# Patient Record
Sex: FEMALE | Race: BLACK OR AFRICAN AMERICAN | NOT HISPANIC OR LATINO | Employment: UNEMPLOYED | URBAN - METROPOLITAN AREA
[De-identification: names, ages, dates, MRNs, and addresses within clinical notes are randomized per-mention and may not be internally consistent; named-entity substitution may affect disease eponyms.]

---

## 2020-09-17 ENCOUNTER — HOSPITAL ENCOUNTER (EMERGENCY)
Facility: CLINIC | Age: 40
Discharge: HOME | End: 2020-09-17
Attending: PHYSICIAN ASSISTANT | Admitting: PHYSICIAN ASSISTANT
Payer: COMMERCIAL

## 2020-09-17 VITALS
OXYGEN SATURATION: 99 % | RESPIRATION RATE: 16 BRPM | TEMPERATURE: 98.6 F | DIASTOLIC BLOOD PRESSURE: 108 MMHG | HEART RATE: 86 BPM | SYSTOLIC BLOOD PRESSURE: 188 MMHG | WEIGHT: 264.55 LBS

## 2020-09-17 DIAGNOSIS — L02.215 ABSCESS, PERINEUM: ICD-10-CM

## 2020-09-17 LAB
ALBUMIN UR-MCNC: 20 MG/DL
ANION GAP SERPL CALCULATED.3IONS-SCNC: 7 MMOL/L (ref 3–14)
APPEARANCE UR: CLEAR
BACTERIA #/AREA URNS HPF: ABNORMAL /HPF
BASOPHILS # BLD AUTO: 0 10E9/L (ref 0–0.2)
BASOPHILS NFR BLD AUTO: 0.3 %
BILIRUB UR QL STRIP: NEGATIVE
BUN SERPL-MCNC: 13 MG/DL (ref 7–30)
CALCIUM SERPL-MCNC: 9.3 MG/DL (ref 8.5–10.1)
CHLORIDE SERPL-SCNC: 105 MMOL/L (ref 94–109)
CO2 SERPL-SCNC: 25 MMOL/L (ref 20–32)
COLOR UR AUTO: ABNORMAL
CREAT SERPL-MCNC: 0.75 MG/DL (ref 0.52–1.04)
DIFFERENTIAL METHOD BLD: ABNORMAL
EOSINOPHIL # BLD AUTO: 0.1 10E9/L (ref 0–0.7)
EOSINOPHIL NFR BLD AUTO: 1 %
ERYTHROCYTE [DISTWIDTH] IN BLOOD BY AUTOMATED COUNT: 13.6 % (ref 10–15)
GFR SERPL CREATININE-BSD FRML MDRD: >90 ML/MIN/{1.73_M2}
GLUCOSE SERPL-MCNC: 132 MG/DL (ref 70–99)
GLUCOSE UR STRIP-MCNC: NEGATIVE MG/DL
HCT VFR BLD AUTO: 40 % (ref 35–47)
HGB BLD-MCNC: 12.4 G/DL (ref 11.7–15.7)
HGB UR QL STRIP: NEGATIVE
IMM GRANULOCYTES # BLD: 0 10E9/L (ref 0–0.4)
IMM GRANULOCYTES NFR BLD: 0.3 %
KETONES UR STRIP-MCNC: NEGATIVE MG/DL
LEUKOCYTE ESTERASE UR QL STRIP: NEGATIVE
LYMPHOCYTES # BLD AUTO: 2.1 10E9/L (ref 0.8–5.3)
LYMPHOCYTES NFR BLD AUTO: 23.4 %
MCH RBC QN AUTO: 27.6 PG (ref 26.5–33)
MCHC RBC AUTO-ENTMCNC: 31 G/DL (ref 31.5–36.5)
MCV RBC AUTO: 89 FL (ref 78–100)
MONOCYTES # BLD AUTO: 0.5 10E9/L (ref 0–1.3)
MONOCYTES NFR BLD AUTO: 6.1 %
MUCOUS THREADS #/AREA URNS LPF: PRESENT /LPF
NEUTROPHILS # BLD AUTO: 6.1 10E9/L (ref 1.6–8.3)
NEUTROPHILS NFR BLD AUTO: 68.9 %
NITRATE UR QL: NEGATIVE
NRBC # BLD AUTO: 0 10*3/UL
NRBC BLD AUTO-RTO: 0 /100
PH UR STRIP: 5.5 PH (ref 5–7)
PLATELET # BLD AUTO: 282 10E9/L (ref 150–450)
POTASSIUM SERPL-SCNC: 3.9 MMOL/L (ref 3.4–5.3)
RBC # BLD AUTO: 4.5 10E12/L (ref 3.8–5.2)
RBC #/AREA URNS AUTO: <1 /HPF (ref 0–2)
SODIUM SERPL-SCNC: 137 MMOL/L (ref 133–144)
SOURCE: ABNORMAL
SP GR UR STRIP: 1.02 (ref 1–1.03)
SQUAMOUS #/AREA URNS AUTO: 2 /HPF (ref 0–1)
UROBILINOGEN UR STRIP-MCNC: NORMAL MG/DL (ref 0–2)
WBC # BLD AUTO: 8.9 10E9/L (ref 4–11)
WBC #/AREA URNS AUTO: 3 /HPF (ref 0–5)

## 2020-09-17 PROCEDURE — 36415 COLL VENOUS BLD VENIPUNCTURE: CPT | Performed by: PHYSICIAN ASSISTANT

## 2020-09-17 PROCEDURE — 81001 URINALYSIS AUTO W/SCOPE: CPT | Performed by: PHYSICIAN ASSISTANT

## 2020-09-17 PROCEDURE — 85025 COMPLETE CBC W/AUTO DIFF WBC: CPT | Performed by: PHYSICIAN ASSISTANT

## 2020-09-17 PROCEDURE — 80048 BASIC METABOLIC PNL TOTAL CA: CPT | Performed by: PHYSICIAN ASSISTANT

## 2020-09-17 PROCEDURE — 99283 EMERGENCY DEPT VISIT LOW MDM: CPT | Mod: 25

## 2020-09-17 PROCEDURE — 56405 I&D VULVA/PERINEAL ABSCESS: CPT

## 2020-09-17 PROCEDURE — 25000132 ZZH RX MED GY IP 250 OP 250 PS 637: Performed by: PHYSICIAN ASSISTANT

## 2020-09-17 RX ORDER — OXYCODONE HYDROCHLORIDE 5 MG/1
5 TABLET ORAL ONCE
Status: COMPLETED | OUTPATIENT
Start: 2020-09-17 | End: 2020-09-17

## 2020-09-17 RX ORDER — LIDOCAINE HYDROCHLORIDE 10 MG/ML
INJECTION, SOLUTION INFILTRATION; PERINEURAL
Status: DISCONTINUED
Start: 2020-09-17 | End: 2020-09-17 | Stop reason: HOSPADM

## 2020-09-17 RX ORDER — CEPHALEXIN 500 MG/1
500 CAPSULE ORAL 4 TIMES DAILY
Qty: 28 CAPSULE | Refills: 0 | Status: SHIPPED | OUTPATIENT
Start: 2020-09-17 | End: 2020-09-24

## 2020-09-17 RX ORDER — OXYCODONE HYDROCHLORIDE 5 MG/1
5 TABLET ORAL EVERY 6 HOURS PRN
Qty: 12 TABLET | Refills: 0 | Status: SHIPPED | OUTPATIENT
Start: 2020-09-17 | End: 2023-12-07

## 2020-09-17 RX ADMIN — OXYCODONE HYDROCHLORIDE 5 MG: 5 TABLET ORAL at 15:48

## 2020-09-17 ASSESSMENT — ENCOUNTER SYMPTOMS
CHILLS: 1
FATIGUE: 1
HEADACHES: 1
DYSURIA: 0

## 2020-09-17 NOTE — ED PROVIDER NOTES
"  History     Chief Complaint:  Labia pain and swelling    The history is limited by a language barrier.  used: family member interpreted.      Angela Villasenor is a 40 year old female who presents with labia swelling and pain. The patient reports 2 weeks ago she developed a \"pimple with no opening\" on the right side of her labia on the external vagina. She states it has been increasing in size and pain since onset. She endorses chills, headache, and fatigue. She denies vaginal discharge and dysuria.     Allergies:  No known drug allergies     Medications:    The patient is not currently taking any prescribed medications.     Past Medical History:    Hypertriglyceridemia   Borderline diabetes mellitus     Past Surgical History:    History reviewed. No pertinent surgical history.     Family History:    History reviewed. No pertinent family history.      Social History:  Smoking status- never smoker  Alcohol use- never  Marital Status:        Review of Systems   Constitutional: Positive for chills and fatigue.   Genitourinary: Positive for vaginal pain. Negative for dysuria and vaginal discharge.   Neurological: Positive for headaches.   All other systems reviewed and are negative.      Physical Exam     Patient Vitals for the past 24 hrs:   BP Temp Temp src Pulse Resp SpO2 Weight   09/17/20 1205 -- -- -- -- -- -- 120 kg (264 lb 8.8 oz)   09/17/20 1204 -- -- -- -- -- -- 120 kg (264 lb 8.8 oz)   09/17/20 1203 (!) 188/108 98.6  F (37  C) Oral 86 16 99 % --     Physical Exam  Constitutional: Pleasant. Cooperative.   Eyes: Pupils equally round and reactive  HENT: Head is normal in appearance. Oropharynx is normal with moist mucus membranes.  Cardiovascular: Regular rate and rhythm and without murmurs.  Respiratory: Normal respiratory effort, lungs are clear bilaterally.  GI: Abdomen is soft, non-tender, non-distended. No guarding, rebound, or rigidity.  Musculoskeletal: No asymmetry of the " lower extremities, no tenderness to palpation.   Skin: Normal, without rash.  Neurologic: Cranial nerves grossly intact, normal cognition, no focal deficits. Alert and oriented x 3.   Psychiatric: Normal affect.  : Swelling and erythema noted to R labia with underlying area of induration 3cm in diameter, fluctuant in the middle. Vagina normal appearing.  Nursing notes and vital signs reviewed.    Emergency Department Course     Laboratory:  Laboratory findings were communicated with the patient who voiced understanding of the findings.    CBC: MCHC 31.0 (H) o/w WNL (WBC 8.9, HGB 12.4, )  BMP: Glucose 132 (H) o/w WNL (Creatinine 0.75)     UA with Microscopic: Protein Albumin 20, Bacteria - few, Squamous epithelial 2 (H), Mucous - present, o/w negative.      Procedures     Incision and Drainage     LOCATIONS:  Right vulva.      ANESTHESIA:  Local field block using Lidocaine 1% plain, total of 2 mLs     PREPARATION:  Cleansed with Betadine     PROCEDURE:  Area was incised with # 11 Blade ([place]) with a Single Straight incision.  Wound treatment included Deloculation and Purulent Drainage.  Packing consisted of Plain Gauze.  Appropriate dressing was applied to cover the area.    PATIENT STATUS:        Patient tolerated the procedure well. There were no complications.    Interventions:  1548 Oxycodone 5 mg PO    Emergency Department Course:  Past medical records, nursing notes, and vitals reviewed.    1320 I performed an exam of the patient as documented above.     1332 IV was inserted and blood was drawn for laboratory testing, results above.    1334 The patient provided a urine sample here in the emergency department. This was sent for laboratory testing, findings above.    1415 I performed the procedure as documented above.     1610 I rechecked the patient and discussed the results of their workup thus far.     Findings and plan explained to the Patient. Patient discharged home with instructions  regarding supportive care, medications, and reasons to return. The importance of close follow-up was reviewed.     Impression & Plan     Medical Decision Making:  Angela Villasenor is a 40 year old female who presents for evaluation of vulvar swelling and pain. On examination, this is consistent with abscess. This was fully drained, per note above. Patient tolerated procedure without issue. Will provide Rx for keflex for home, as well as short course of pain medications. Discussed narcotic precautions. Packing placed, advised her to remove in 1-2 days. [place] patient safe for discharge to home. Discussed reasons to return. All questions answered. Patient discharged to home in stable condition.    Diagnosis:    ICD-10-CM   1. Abscess, perineum  L02.215     Disposition:  Discharged to home.    Discharge Medications:  New Prescriptions    CEPHALEXIN (KEFLEX) 500 MG CAPSULE    Take 1 capsule (500 mg) by mouth 4 times daily for 7 days    OXYCODONE (ROXICODONE) 5 MG TABLET    Take 1 tablet (5 mg) by mouth every 6 hours as needed for pain     Scribe Disclosure:  I, Samanta Mathias, am serving as a scribe at 3:16 PM on 9/17/2020 to document services personally performed by Cyril Ibrahim PA-C based on my observations and the provider's statements to me.      This record was created at least in part using electronic voice recognition software, so please excuse any typographical errors.       Cyril Ibrahim PA-C  09/17/20 3925

## 2020-09-17 NOTE — DISCHARGE INSTRUCTIONS
Take full course of antibiotics.  Use Tylenol and ibuprofen for pain.  Use oxycodone for breakthrough pain. This is sedating. Do not drive after taking.   Remove packing in 1-2 days.  Watch for spreading redness around wound, worsening pain.

## 2020-09-17 NOTE — ED TRIAGE NOTES
Pain in right labia for one week, swelling and pain worsening over time.  ABCs intact.  Patient is alert and oriented x3.

## 2020-09-17 NOTE — ED AVS SNAPSHOT
[facility] Emergency Department  [address]  [facility] [address]  Phone:  [phone]  Fax:  [phone]                                    Angela Villasenor   MRN: [id number]    Department:  [facility] Emergency Department   Date of Visit:  9/17/2020           After Visit Summary Signature Page    I have received my discharge instructions, and my questions have been answered. I have discussed any challenges I see with this plan with the nurse or doctor.    ..........................................................................................................................................  Patient/Patient Representative Signature      ..........................................................................................................................................  Patient Representative Print Name and Relationship to Patient    ..................................................               ................................................  Date                                   Time    ..........................................................................................................................................  Reviewed by Signature/Title    ...................................................              ..............................................  Date                                               Time          22EPIC Rev 08/18

## 2020-09-18 ENCOUNTER — NURSE TRIAGE (OUTPATIENT)
Dept: NURSING | Facility: CLINIC | Age: 40
End: 2020-09-18

## 2020-09-18 NOTE — TELEPHONE ENCOUNTER
Reviewed the AVS with the patient and advised them to follow up with pcp next week.    COVID 19 Nurse Triage Plan/Patient Instructions    Please be aware that novel coronavirus (COVID-19) may be circulating in the community. If you develop symptoms such as fever, cough, or SOB or if you have concerns about the presence of another infection including coronavirus (COVID-19), please contact your health care provider or visit [email or web address].     Disposition/Instructions    Home care recommended. Follow home care protocol based instructions.    Thank you for taking steps to prevent the spread of this virus.  o Limit your contact with others.  o Wear a simple mask to cover your cough.  o Wash your hands well and often.    Resources    [facility]: About COVID-19: [email or web address]    CDC: What to Do If You're Sick: www.cdc.gov/coronavirus/2019-ncov/about/steps-when-sick.html    CDC: Ending Home Isolation: www.cdc.gov/coronavirus/2019-ncov/hcp/disposition-in-home-patients.html     CDC: Caring for Someone: www.cdc.gov/coronavirus/2019-ncov/if-you-are-sick/care-for-someone.html     [facility]: Interim Guidance for Hospital Discharge to Home: www.[facility].[place]/diseases/coronavirus/hcp/hospdischarge.pdf    [facility] clinical trials (COVID-19 research studies): [email or web address].[facility]-clinical-trials     Below are the COVID-19 hotlines at the [place] Department [facility] ([facility]). Interpreters are available.   o For health questions: Call 135-659-2956 or 1-598.569.4486 (7 a.m. to 7 p.m.)  o For questions about schools and childcare: Call 406-206-7572 or 1-869.361.4949 (7 a.m. to 7 p.m.)           Evette Webber RN        Additional Information    Negative: [1] Major abdominal surgical incision AND [2] wound gaping open AND [3] visible internal organs    Negative: Sounds like a life-threatening emergency to the triager    Negative: [1] Bleeding from incision AND [2] won't stop after 10  minutes of direct pressure    Negative: [1] Widespread rash AND [2] bright red, sunburn-like    Negative: Severe pain in the incision    Negative: [1] Incision gaping open AND [2] < 48 hours since wound re-opened    Negative: [1] Incision gaping open AND [2] length of opening > 2 inches (5 cm)    Negative: Patient sounds very sick or weak to the triager    Negative: Sounds like a serious complication to the triager    Negative: Fever > 100.4 F (38.0 C)    Negative: [1] Incision looks infected (spreading redness, pain) AND [2] fever > 99.5 F (37.5 C)    Negative: [1] Incision looks infected (spreading redness, pain) AND [2] large red area (> 2 in. or 5 cm)    Negative: [1] Incision looks infected (spreading redness, pain) AND [2] face wound    Negative: [1] Red streak runs from the incision AND [2] longer than 1 inch (2.5 cm)    Negative: [1] Pus or bad-smelling fluid draining from incision AND [2] no fever    Negative: [1] Post-op pain AND [2] not controlled with pain medications    Negative: Dressing soaked with blood or body fluid (e.g., drainage)    Negative: [1] Raised bruise and [2] size > 2 inches (5 cm) and expanding    Negative: [1] Caller has URGENT question AND [2] triager unable to answer question    Negative: [1] INCREASING pain in incision AND [2] > 2 days (48 hours) since surgery    Negative: [1] Small red area or streak AND [2] no fever    Negative: [1] Clear or blood-tinged fluid draining from wound AND [2] no fever    Negative: [1] Incision gaping open AND [2] > 48 hours since wound re-opened AND [3] length of opening > 1/2 inch (12 mm)    Negative: [1] Incision on face gaping open after skin glue AND [2] > 48 hours since wound re-opened 3] length of opening > 1/4 inch (6 mm)    Negative: Suture or staple removal is overdue    Negative: [1] Suture or staple came out early AND [2] caller wants wound checked    Negative: [1] Caller has NON-URGENT question AND [2] triager unable to answer question     Negative: Pimple where a stitch comes through the skin    Surgical incision after sutures or staples removed, questions about    Protocols used: POST-OP INCISION SYMPTOMS-A-AH

## 2021-01-05 ENCOUNTER — HOSPITAL ENCOUNTER (EMERGENCY)
Facility: CLINIC | Age: 41
Discharge: HOME | End: 2021-01-05
Attending: EMERGENCY MEDICINE | Admitting: EMERGENCY MEDICINE
Payer: COMMERCIAL

## 2021-01-05 ENCOUNTER — APPOINTMENT (OUTPATIENT)
Dept: ULTRASOUND IMAGING | Facility: CLINIC | Age: 41
End: 2021-01-05
Attending: EMERGENCY MEDICINE
Payer: COMMERCIAL

## 2021-01-05 VITALS
SYSTOLIC BLOOD PRESSURE: 147 MMHG | HEART RATE: 80 BPM | HEIGHT: 61 IN | OXYGEN SATURATION: 97 % | TEMPERATURE: 97.6 F | WEIGHT: 236.99 LBS | BODY MASS INDEX: 44.75 KG/M2 | RESPIRATION RATE: 20 BRPM | DIASTOLIC BLOOD PRESSURE: 91 MMHG

## 2021-01-05 DIAGNOSIS — K29.00 ACUTE GASTRITIS WITHOUT HEMORRHAGE, UNSPECIFIED GASTRITIS TYPE: ICD-10-CM

## 2021-01-05 DIAGNOSIS — R10.13 EPIGASTRIC PAIN: ICD-10-CM

## 2021-01-05 LAB
ALBUMIN SERPL-MCNC: 3.7 G/DL (ref 3.4–5)
ALP SERPL-CCNC: 106 U/L (ref 40–150)
ALT SERPL W P-5'-P-CCNC: 37 U/L (ref 0–50)
ANION GAP SERPL CALCULATED.3IONS-SCNC: 3 MMOL/L (ref 3–14)
AST SERPL W P-5'-P-CCNC: 25 U/L (ref 0–45)
B-HCG FREE SERPL-ACNC: <5 IU/L
BASOPHILS # BLD AUTO: 0 10E9/L (ref 0–0.2)
BASOPHILS NFR BLD AUTO: 0.3 %
BILIRUB SERPL-MCNC: 0.6 MG/DL (ref 0.2–1.3)
BUN SERPL-MCNC: 13 MG/DL (ref 7–30)
CALCIUM SERPL-MCNC: 8.9 MG/DL (ref 8.5–10.1)
CHLORIDE SERPL-SCNC: 105 MMOL/L (ref 94–109)
CO2 SERPL-SCNC: 29 MMOL/L (ref 20–32)
CREAT SERPL-MCNC: 0.7 MG/DL (ref 0.52–1.04)
DIFFERENTIAL METHOD BLD: NORMAL
EOSINOPHIL # BLD AUTO: 0.1 10E9/L (ref 0–0.7)
EOSINOPHIL NFR BLD AUTO: 1 %
ERYTHROCYTE [DISTWIDTH] IN BLOOD BY AUTOMATED COUNT: 13.2 % (ref 10–15)
GFR SERPL CREATININE-BSD FRML MDRD: >90 ML/MIN/{1.73_M2}
GLUCOSE SERPL-MCNC: 125 MG/DL (ref 70–99)
HCT VFR BLD AUTO: 37.5 % (ref 35–47)
HGB BLD-MCNC: 11.9 G/DL (ref 11.7–15.7)
IMM GRANULOCYTES # BLD: 0 10E9/L (ref 0–0.4)
IMM GRANULOCYTES NFR BLD: 0.3 %
LIPASE SERPL-CCNC: 79 U/L (ref 73–393)
LYMPHOCYTES # BLD AUTO: 2.6 10E9/L (ref 0.8–5.3)
LYMPHOCYTES NFR BLD AUTO: 36.1 %
MCH RBC QN AUTO: 27.8 PG (ref 26.5–33)
MCHC RBC AUTO-ENTMCNC: 31.7 G/DL (ref 31.5–36.5)
MCV RBC AUTO: 88 FL (ref 78–100)
MONOCYTES # BLD AUTO: 0.6 10E9/L (ref 0–1.3)
MONOCYTES NFR BLD AUTO: 7.9 %
NEUTROPHILS # BLD AUTO: 4 10E9/L (ref 1.6–8.3)
NEUTROPHILS NFR BLD AUTO: 54.4 %
NRBC # BLD AUTO: 0 10*3/UL
NRBC BLD AUTO-RTO: 0 /100
PLATELET # BLD AUTO: 263 10E9/L (ref 150–450)
POTASSIUM SERPL-SCNC: 3.2 MMOL/L (ref 3.4–5.3)
PROT SERPL-MCNC: 8.4 G/DL (ref 6.8–8.8)
RBC # BLD AUTO: 4.28 10E12/L (ref 3.8–5.2)
SODIUM SERPL-SCNC: 137 MMOL/L (ref 133–144)
WBC # BLD AUTO: 7.3 10E9/L (ref 4–11)

## 2021-01-05 PROCEDURE — 85025 COMPLETE CBC W/AUTO DIFF WBC: CPT | Performed by: EMERGENCY MEDICINE

## 2021-01-05 PROCEDURE — 99285 EMERGENCY DEPT VISIT HI MDM: CPT | Mod: 25

## 2021-01-05 PROCEDURE — 258N000003 HC RX IP 258 OP 636: Performed by: EMERGENCY MEDICINE

## 2021-01-05 PROCEDURE — 250N000013 HC RX MED GY IP 250 OP 250 PS 637: Performed by: EMERGENCY MEDICINE

## 2021-01-05 PROCEDURE — 80053 COMPREHEN METABOLIC PANEL: CPT | Performed by: EMERGENCY MEDICINE

## 2021-01-05 PROCEDURE — 83690 ASSAY OF LIPASE: CPT | Performed by: EMERGENCY MEDICINE

## 2021-01-05 PROCEDURE — 96361 HYDRATE IV INFUSION ADD-ON: CPT

## 2021-01-05 PROCEDURE — 250N000009 HC RX 250: Performed by: EMERGENCY MEDICINE

## 2021-01-05 PROCEDURE — 250N000011 HC RX IP 250 OP 636: Performed by: EMERGENCY MEDICINE

## 2021-01-05 PROCEDURE — 96374 THER/PROPH/DIAG INJ IV PUSH: CPT

## 2021-01-05 PROCEDURE — 76705 ECHO EXAM OF ABDOMEN: CPT

## 2021-01-05 PROCEDURE — 84702 CHORIONIC GONADOTROPIN TEST: CPT

## 2021-01-05 RX ORDER — ONDANSETRON 2 MG/ML
4 INJECTION INTRAMUSCULAR; INTRAVENOUS ONCE
Status: COMPLETED | OUTPATIENT
Start: 2021-01-05 | End: 2021-01-05

## 2021-01-05 RX ORDER — ONDANSETRON 4 MG/1
4 TABLET, ORALLY DISINTEGRATING ORAL EVERY 8 HOURS PRN
Qty: 10 TABLET | Refills: 0 | Status: SHIPPED | OUTPATIENT
Start: 2021-01-05 | End: 2021-01-08

## 2021-01-05 RX ORDER — KETOROLAC TROMETHAMINE 15 MG/ML
15 INJECTION, SOLUTION INTRAMUSCULAR; INTRAVENOUS ONCE
Status: COMPLETED | OUTPATIENT
Start: 2021-01-05 | End: 2021-01-05

## 2021-01-05 RX ORDER — FAMOTIDINE 40 MG/1
40 TABLET, FILM COATED ORAL DAILY
Qty: 7 TABLET | Refills: 0 | Status: SHIPPED | OUTPATIENT
Start: 2021-01-05 | End: 2021-01-12

## 2021-01-05 RX ADMIN — SODIUM CHLORIDE, POTASSIUM CHLORIDE, SODIUM LACTATE AND CALCIUM CHLORIDE 1000 ML: 600; 310; 30; 20 INJECTION, SOLUTION INTRAVENOUS at 01:17

## 2021-01-05 RX ADMIN — KETOROLAC TROMETHAMINE 15 MG: 15 INJECTION, SOLUTION INTRAMUSCULAR; INTRAVENOUS at 01:43

## 2021-01-05 RX ADMIN — ONDANSETRON 4 MG: 2 INJECTION INTRAMUSCULAR; INTRAVENOUS at 01:20

## 2021-01-05 RX ADMIN — LIDOCAINE HYDROCHLORIDE 30 ML: 20 SOLUTION ORAL; TOPICAL at 01:42

## 2021-01-05 SDOH — HEALTH STABILITY: MENTAL HEALTH: HOW OFTEN DO YOU HAVE A DRINK CONTAINING ALCOHOL?: NEVER

## 2021-01-05 ASSESSMENT — ENCOUNTER SYMPTOMS
ABDOMINAL PAIN: 1
NAUSEA: 1
BLOOD IN STOOL: 0
DYSURIA: 0
DIARRHEA: 0
DIFFICULTY URINATING: 0
VOMITING: 1
SHORTNESS OF BREATH: 0
FEVER: 0

## 2021-01-05 ASSESSMENT — MIFFLIN-ST. JEOR: SCORE: 1667.38

## 2021-01-05 NOTE — ED AVS SNAPSHOT
[facility] Emergency Dept  [address]  [facility] [address]  Phone: [phone]  Fax: [phone]                                    Angela Villasenor   MRN: [id number]    Department: [facility] Emergency Dept   Date of Visit: 1/5/2021           After Visit Summary Signature Page    I have received my discharge instructions, and my questions have been answered. I have discussed any challenges I see with this plan with the nurse or doctor.    ..........................................................................................................................................  Patient/Patient Representative Signature      ..........................................................................................................................................  Patient Representative Print Name and Relationship to Patient    ..................................................               ................................................  Date                                   Time    ..........................................................................................................................................  Reviewed by Signature/Title    ...................................................              ..............................................  Date                                               Time          22EPIC Rev 08/18

## 2021-01-05 NOTE — ED PROVIDER NOTES
"    History   Chief Complaint:  Abdominal Pain     The history is provided by the patient.      Angela Villasenor is a 41 year old female who presents with abdominal pain. The patient notes that for the past 5 days she has had right upper quadrant abdominal pain that has been constant. She reports that she also has had some emesis but denies any blood in it. She denies black or bloody stools. She denies urinary symptoms or chance of pregnancy. She denies shortness of breath, or fever.         Records in care everywhere show that she was just in [facility] a couple of days ago for an observation admission for similar symptoms of epigastric pain, nausea vomiting and atypical chest pain.  She had a negative troponin during that evaluation and ultimately left AMA.  Covid test was negative as well.  Chest x-ray was negative.  Sounds like plan was for stress test but she left prior to that being completed.    Review of Systems   Constitutional: Negative for fever.   Respiratory: Negative for shortness of breath.    Gastrointestinal: Positive for abdominal pain, nausea and vomiting. Negative for blood in stool and diarrhea.   Genitourinary: Negative for difficulty urinating and dysuria.   All other systems reviewed and are negative.        Allergies:  No known drug allergies     Medications:  Gemfibrozil     Past Medical History:    Diabetes  Hypertension   Hypertriglyceridemia     Past Surgical History:    The patient does not have any past surgical history.     Family History:    No past family history.     Social History:  PCP: Ragini Workman MD    Physical Exam     Patient Vitals for the past 24 hrs:   BP Temp Temp src Pulse Resp SpO2 Height Weight   01/05/21 0145 (!) 151/82 -- -- 75 -- 99 % -- --   01/05/21 0140 139/87 -- -- 81 -- -- -- --   01/05/21 0042 (!) 153/91 97.6  F (36.4  C) Temporal 89 20 100 % 1.549 m (5' 1\") 107.5 kg (236 lb 15.9 oz)       Physical Exam  Gen: well appearing, in no acute " distress  HENT:  mmm, no rhinorrhea  Eyes: periorbital tissues and sclera normal   Neck: supple, no abnormal swelling  Lungs:  CTAB,  no resp distress  CV: rrr, no m/r/g, ppi  Abd: soft, tenderness palpation present in the epigastrium and right upper quadrant, negative Cruz sign, nondistended, no rebound/masses/guarding/hsm  Ext: no peripheral edema  Skin: warm, dry, well perfused, no rashes/bruising/lesions on exposed skin  Neuro: alert, MAEE, no gross motor or sensory deficits, gait stable  Psych: Normal mood, normal affect      Emergency Department Course     Imaging:  Abdomen US Limited (RUQ):   1.  No calcified gallstones, bladder wall thickening or pericholecystic fluid.     2.  No biliary dilatation.     3.  Diffuse fatty infiltration of the liver.     4.  No hydronephrosis.     5.  Pancreas not well-visualized due to bowel gas.    Reading per radiology      Laboratory:  CBC: WBC 7.3, HGB 11.9,    CMP: Glucose 125(H), Potassium 3.2(L), o/w WNL (Creatinine: 0.70)    Lipase: 79   ISTAT HCG Quantitative Pregnancy (POCT): <5.0     Emergency Department Course:  Reviewed:  I reviewed the patient's nursing notes, vitals, past medical records, Care Everywhere.     Assessments:  0058 I performed an examination of the patient as noted above.   0224 I checked on and updated the patient prior to discharge.       Interventions:  0117 NS 1L IV Bolus    0120 Zofran 4mg IV injection    0142 GI Cocktail (Maalox/Mylanta and viscous Lidocaine), 30 mL suspension, PO   0143 Toradol 15 mg, IV    Disposition:  The patient was discharged to home.       Impression & Plan   Medical Decision Makin-year-old female presenting with epigastric right upper quadrant pain associated with nonbilious nonbloody emesis constant for the last 4 days.  Some tenderness on examination but no guarding distention or concern for peritonitis.  Recently was seen per care everywhere in [facility] for similar symptoms and had a negative  work-up that evaluation included a troponin.  The way she describes her symptoms and the tenderness on examination I believe makes it unlikely she has a atypical presentation of an acute coronary syndrome.  This seems much more likely to be gastritis versus peptic ulcer disease versus hepatobiliary etiology or pancreatitis.    Symptoms improved with the above interventions here in the emergency department.  No significant blood test abnormalities on CBC CMP and lipase.  Right upper quadrant ultrasound showed no acute abnormalities.  Repeat abdominal exam was benign.  I think at this point she is safe and stable for discharge home with supportive care with antacids antiemetics and follow-up with PCP.  With an  we discussed what is known unknown based on the work-up today as well as follow-up plan when to return to the ED.  She was comfortable and agreeable with that plan.      Diagnosis:    ICD-10-CM    1. Epigastric pain  R10.13    2. Acute gastritis without hemorrhage, unspecified gastritis type  K29.00        Discharge Medications:  New Prescriptions    FAMOTIDINE (PEPCID) 40 MG TABLET    Take 1 tablet (40 mg) by mouth daily for 7 days    OMEPRAZOLE (PRILOSEC) 20 MG DR CAPSULE    Take 2 capsules (40 mg) by mouth daily for 14 days    ONDANSETRON (ZOFRAN ODT) 4 MG ODT TAB    Take 1 tablet (4 mg) by mouth every 8 hours as needed for nausea or vomiting       Scribe Disclosure:  MALISSA, Marla Yuan, am serving as a scribe at 1:00 AM on 1/5/2021 to document services personally performed by Adithya Weller MD based on my observations and the provider's statements to me.             Adithya Weller MD  01/05/21 [id number]

## 2021-01-07 ENCOUNTER — HOSPITAL ENCOUNTER (EMERGENCY)
Facility: CLINIC | Age: 41
Discharge: HOME | End: 2021-01-07
Attending: PHYSICIAN ASSISTANT | Admitting: PHYSICIAN ASSISTANT
Payer: COMMERCIAL

## 2021-01-07 VITALS
HEART RATE: 117 BPM | OXYGEN SATURATION: 100 % | DIASTOLIC BLOOD PRESSURE: 96 MMHG | RESPIRATION RATE: 19 BRPM | TEMPERATURE: 97.9 F | SYSTOLIC BLOOD PRESSURE: 166 MMHG

## 2021-01-07 DIAGNOSIS — K64.8 INTERNAL HEMORRHOID: ICD-10-CM

## 2021-01-07 DIAGNOSIS — K64.4 EXTERNAL HEMORRHOIDS: ICD-10-CM

## 2021-01-07 PROCEDURE — 99283 EMERGENCY DEPT VISIT LOW MDM: CPT

## 2021-01-07 PROCEDURE — 250N000013 HC RX MED GY IP 250 OP 250 PS 637: Performed by: PHYSICIAN ASSISTANT

## 2021-01-07 RX ORDER — HYDROCORTISONE 25 MG/G
CREAM TOPICAL 2 TIMES DAILY PRN
Qty: 30 G | Refills: 0 | Status: SHIPPED | OUTPATIENT
Start: 2021-01-07

## 2021-01-07 RX ORDER — POLYETHYLENE GLYCOL 3350 17 G/17G
1 POWDER, FOR SOLUTION ORAL DAILY
Qty: 510 G | Refills: 0 | Status: SHIPPED | OUTPATIENT
Start: 2021-01-07 | End: 2021-02-06

## 2021-01-07 RX ORDER — HYDROCODONE BITARTRATE AND ACETAMINOPHEN 5; 325 MG/1; MG/1
1 TABLET ORAL EVERY 6 HOURS PRN
Qty: 18 TABLET | Refills: 0 | Status: SHIPPED | OUTPATIENT
Start: 2021-01-07 | End: 2021-01-12

## 2021-01-07 RX ORDER — HYDROCODONE BITARTRATE AND ACETAMINOPHEN 5; 325 MG/1; MG/1
2 TABLET ORAL ONCE
Status: COMPLETED | OUTPATIENT
Start: 2021-01-07 | End: 2021-01-07

## 2021-01-07 RX ADMIN — HYDROCODONE BITARTRATE AND ACETAMINOPHEN 2 TABLET: 5; 325 TABLET ORAL at 20:56

## 2021-01-07 ASSESSMENT — ENCOUNTER SYMPTOMS
DIAPHORESIS: 0
FREQUENCY: 0
NAUSEA: 0
ABDOMINAL PAIN: 0
VOMITING: 0
DYSURIA: 0
DIFFICULTY URINATING: 0
FEVER: 0
RECTAL PAIN: 1
CHILLS: 0

## 2021-01-07 NOTE — ED AVS SNAPSHOT
[facility] Emergency Dept  [address]  [facility] [address]  Phone: [phone]  Fax: [phone]                                    Angela Villasenor   MRN: [id number]    Department: [facility] Emergency Dept   Date of Visit: 1/7/2021           After Visit Summary Signature Page    I have received my discharge instructions, and my questions have been answered. I have discussed any challenges I see with this plan with the nurse or doctor.    ..........................................................................................................................................  Patient/Patient Representative Signature      ..........................................................................................................................................  Patient Representative Print Name and Relationship to Patient    ..................................................               ................................................  Date                                   Time    ..........................................................................................................................................  Reviewed by Signature/Title    ...................................................              ..............................................  Date                                               Time          22EPIC Rev 08/18

## 2021-01-08 NOTE — ED TRIAGE NOTES
Reports 3 days of internal and external hemorrhoid pain. Pt has had them in the past. Reports 10/10 pain, unable to sit and blood with BM, last BM this AM. VSS on RA, A&O x 4, [place]  used for triage.

## 2021-01-08 NOTE — ED PROVIDER NOTES
"  History   Chief Complaint:  Rectal Pain     HPI   A [employer] phone  was used obtain the below history as well as to explain diagnosis, plan of treatment, reasons to return to the emergency department and appropriate follow up.    Angela Villasenor is a 41 year old female with history of diabetes mellitus type II and hypertension who presents with rectal pain. The patient states that she has been experiencing intermittent episodes of \"burning\" rectal pain since yesterday morning. She reports that the pain spans across her entire bottom. She reports that nothing alleviates or exacerbates her pain. She denies any fever, diaphoresis, chills, nausea, vomiting, abdominal pain, urinary symptoms, or any other symptoms. Of note, the patient states that she has a history of hemorrhoids that has been persistent for the past three years.    Review of Systems   Constitutional: Negative for chills, diaphoresis and fever.   Gastrointestinal: Positive for rectal pain. Negative for abdominal pain, nausea and vomiting.   Genitourinary: Negative for difficulty urinating, dysuria, frequency and urgency.   All other systems reviewed and are negative.    Allergies:  The patient has no known allergies.     Medications:  Pepcid  Prilosec  Zofran  Metformin    Past Medical History:    Diabetes mellitus type II   Hypertension    Social History:  Smoking status: No  Alcohol use: No    Physical Exam     Patient Vitals for the past 24 hrs:   BP Temp Temp src Pulse Resp SpO2   01/07/21 2027 (!) 166/96 97.9  F (36.6  C) Temporal 117 19 100 %       Physical Exam  Vitals signs and nursing note reviewed.   HENT:      Head: Normocephalic and atraumatic.   Eyes:      General: No scleral icterus.  Cardiovascular:      Rate and Rhythm: Normal rate and regular rhythm.   Pulmonary:      Effort: Pulmonary effort is normal. No respiratory distress.   Abdominal:      General: There is no distension.      Palpations: Abdomen is soft.      " Tenderness: There is no abdominal tenderness. There is no guarding.   Genitourinary:     Rectum: Tenderness and external hemorrhoid present. No mass.   Musculoskeletal:         General: No tenderness.   Skin:     General: Skin is warm.      Findings: No rash.   Neurological:      Mental Status: She is alert.       Emergency Department Course     Emergency Department Course:    Reviewed:  I reviewed nursing notes, vitals, past medical history and care everywhere.    Assessments:  2039 I performed an exam of the patient as documented above.     2125 I rechecked the patient.    Interventions:  2056 Norco 2 tablet PO    Disposition:  The patient was discharged to home.     Impression & Plan   Covid-19  Angela Villasenor was evaluated during a global COVID-19 pandemic, which necessitated consideration that the patient might be at risk for infection with the SARS-CoV-2 virus that causes COVID-19.   Applicable protocols for evaluation were followed during the patient's care.     Medical Decision Making:  Presents for evaluation of rectal pain. No findings of perianal abscess, cellulitis. She demonstrates circumferential hemorrhoids without thrombosis at this time. Oral analgesia, stool softeners, hydrocortisone topical, outpatient follow up       Diagnosis:    ICD-10-CM    1. External hemorrhoids  K64.4    2. Internal hemorrhoid  K64.8        Discharge Medications:  New Prescriptions    DOCUSATE SODIUM (COLACE) 50 MG CAPSULE    Take 1 capsule (50 mg) by mouth 2 times daily for 7 days    HYDROCODONE-ACETAMINOPHEN (NORCO) 5-325 MG TABLET    Take 1 tablet by mouth every 6 hours as needed for pain    HYDROCORTISONE, PERIANAL, (HYDROCORTISONE) 2.5 % CREAM    Place rectally 2 times daily as needed for hemorrhoids    POLYETHYLENE GLYCOL (MIRALAX) 17 GM/DOSE POWDER    Take 17 g (1 capful) by mouth daily       Scribe Disclosure:  Oscar LEONARDO, am serving as a scribe at 8:39 PM on 1/7/2021 to document services personally  performed by Young Love PA-C based on my observations and the provider's statements to me.          Young Love PA-C  01/07/21 0245

## 2021-01-28 ENCOUNTER — HOSPITAL ENCOUNTER (EMERGENCY)
Facility: CLINIC | Age: 41
Discharge: HOME | End: 2021-01-29
Attending: EMERGENCY MEDICINE | Admitting: EMERGENCY MEDICINE
Payer: COMMERCIAL

## 2021-01-28 VITALS
HEART RATE: 91 BPM | TEMPERATURE: 98.3 F | DIASTOLIC BLOOD PRESSURE: 99 MMHG | SYSTOLIC BLOOD PRESSURE: 153 MMHG | RESPIRATION RATE: 18 BRPM | OXYGEN SATURATION: 100 %

## 2021-01-28 DIAGNOSIS — K64.4 EXTERNAL HEMORRHOIDS: ICD-10-CM

## 2021-01-28 DIAGNOSIS — R07.89 NON-CARDIAC CHEST PAIN: ICD-10-CM

## 2021-01-28 LAB
ANION GAP SERPL CALCULATED.3IONS-SCNC: 2 MMOL/L (ref 3–14)
BASOPHILS # BLD AUTO: 0.1 10E9/L (ref 0–0.2)
BASOPHILS NFR BLD AUTO: 0.6 %
BUN SERPL-MCNC: 17 MG/DL (ref 7–30)
CALCIUM SERPL-MCNC: 8.8 MG/DL (ref 8.5–10.1)
CHLORIDE SERPL-SCNC: 107 MMOL/L (ref 94–109)
CO2 SERPL-SCNC: 28 MMOL/L (ref 20–32)
CREAT SERPL-MCNC: 0.8 MG/DL (ref 0.52–1.04)
DIFFERENTIAL METHOD BLD: ABNORMAL
EOSINOPHIL # BLD AUTO: 0.2 10E9/L (ref 0–0.7)
EOSINOPHIL NFR BLD AUTO: 2.6 %
ERYTHROCYTE [DISTWIDTH] IN BLOOD BY AUTOMATED COUNT: 13.8 % (ref 10–15)
GFR SERPL CREATININE-BSD FRML MDRD: 90 ML/MIN/{1.73_M2}
GLUCOSE SERPL-MCNC: 135 MG/DL (ref 70–99)
HCT VFR BLD AUTO: 35 % (ref 35–47)
HGB BLD-MCNC: 11.1 G/DL (ref 11.7–15.7)
IMM GRANULOCYTES # BLD: 0.1 10E9/L (ref 0–0.4)
IMM GRANULOCYTES NFR BLD: 0.6 %
LYMPHOCYTES # BLD AUTO: 3.3 10E9/L (ref 0.8–5.3)
LYMPHOCYTES NFR BLD AUTO: 36.6 %
MCH RBC QN AUTO: 27.8 PG (ref 26.5–33)
MCHC RBC AUTO-ENTMCNC: 31.7 G/DL (ref 31.5–36.5)
MCV RBC AUTO: 88 FL (ref 78–100)
MONOCYTES # BLD AUTO: 0.6 10E9/L (ref 0–1.3)
MONOCYTES NFR BLD AUTO: 7.1 %
NEUTROPHILS # BLD AUTO: 4.7 10E9/L (ref 1.6–8.3)
NEUTROPHILS NFR BLD AUTO: 52.5 %
NRBC # BLD AUTO: 0 10*3/UL
NRBC BLD AUTO-RTO: 0 /100
PLATELET # BLD AUTO: 314 10E9/L (ref 150–450)
POTASSIUM SERPL-SCNC: 4.1 MMOL/L (ref 3.4–5.3)
RBC # BLD AUTO: 3.99 10E12/L (ref 3.8–5.2)
SODIUM SERPL-SCNC: 137 MMOL/L (ref 133–144)
TROPONIN I SERPL-MCNC: <0.015 UG/L (ref 0–0.04)
WBC # BLD AUTO: 8.9 10E9/L (ref 4–11)

## 2021-01-28 PROCEDURE — 85025 COMPLETE CBC W/AUTO DIFF WBC: CPT | Performed by: EMERGENCY MEDICINE

## 2021-01-28 PROCEDURE — 84484 ASSAY OF TROPONIN QUANT: CPT | Performed by: EMERGENCY MEDICINE

## 2021-01-28 PROCEDURE — 99284 EMERGENCY DEPT VISIT MOD MDM: CPT

## 2021-01-28 PROCEDURE — 80048 BASIC METABOLIC PNL TOTAL CA: CPT | Performed by: EMERGENCY MEDICINE

## 2021-01-28 PROCEDURE — 93005 ELECTROCARDIOGRAM TRACING: CPT

## 2021-01-29 LAB — INTERPRETATION ECG - MUSE: NORMAL

## 2021-01-29 ASSESSMENT — ENCOUNTER SYMPTOMS
PALPITATIONS: 1
RECTAL PAIN: 1

## 2021-01-29 NOTE — ED PROVIDER NOTES
History     Chief Complaint:  Multiple complaints    HPI   A Slovenian  was used to provide history.    Angela Villasenor is a 41 year old female with a history of DM, HTN, HLD, and obesity presents to the emergency department for evaluation of multiple complaints.  Patient reports struggling with hemorrhoids for the past month.  The pain worsened tonight, prompting her presentation.  She has been prescribed triamcinolone cream but states she has not been using this as it causes a burning sensation.  Patient also complains of chest pain due to her heart beating fast for the past 20 days. This has also been evaluated recently with a negative work up.     Allergies:  No known drug allergies    Medications:    Triamcinolone     Past Medical History:    DM   HTN  HLD  Obesity  Hemorrhoids     Social History:  The patient presents to the emergency department alone.  Patient speaks Slovenian.    Review of Systems   Cardiovascular: Positive for chest pain and palpitations.   Gastrointestinal: Positive for rectal pain.   All other systems reviewed and are negative.    Physical Exam     Patient Vitals for the past 24 hrs:   BP Temp Temp src Pulse Resp SpO2   01/28/21 2202 153/99 98.3  F (36.8  C) Oral 91 18 100 %     Physical Exam  Nursing note and vitals reviewed.  Constitutional: Cooperative.   HENT:   Mouth/Throat: Mucous membranes are normal.   Cardiovascular: Normal rate, regular rhythm and normal heart sounds.  No murmur.  Pulmonary/Chest: Effort normal and breath sounds normal. No respiratory distress. No wheezes. No rales.   Abdominal: Soft. Normal appearance. No distension. There is no tenderness.   Rectal: Circumferential non-thrombosed hemorrhoids.  Musculoskeletal: No LE edema  Neurological: Alert. Oriented x4  Skin: Skin is warm and dry.   Psychiatric: Normal mood and affect.        Emergency Department Course   EKG  Indication: Chest Pain  Time: 21:59:44  Rate 86 bpm. MT interval 170. QRS duration 90.  QT/QTc 352/421. P-R-T axes 49 38 51  Normal sinus rhythm. Nonspecific T wave abnormality.   Interpreted at 2202 by Balaji White MD.    Laboratory:  CBC: WBC: 8.9, HGB: 11.1 (L), PLT: 314  BMP: Glucose 135 (H), Anion Gap: 2 (L), o/w WNL (Creatinine: 0.80)    Troponin (2328): <0.015    Emergency Department Course:  Reviewed:  I reviewed the patient's nursing notes, vitals, past medical records, Care Everywhere.     Assessments:  0004 I assessed the patient. Exam findings described above.    Disposition:  Discharged to home.    Impression & Plan    Medical Decision Making:  Angela Villasenor is a 41 year old female who presents for evaluation of anorectal pain.  There has been no associated bleeding with this.  A broad differential for their pain was considered including fissure, hemorrhoid, mass/tumor, perirectal abscess, inflammatory bowel disease, foreign body, etc.   Patient advised to continue using her triamcinolone cream.  I also advised her to continue calling surgery to see if she can reschedule her hemorrhoidectomy for an earlier date.  Patient also complains of chest pain.  The work up in the Emergency Department is negative.  I considered a broad differential diagnosis in this patient including life-threatening etiologies such as acute coronary syndrome, myocardial infarction, pulmonary embolism, acute aortic dissection, myocarditis, pericarditis, acute valvular insufficiency amongst others.  Other causes considered for this patient included pneumonia, pneumothorax, chest wall source, pericarditis, pleurisy, esophageal spasm, etc.  No serious etiology for the chest pain were detected today during this visit. Close follow up with primary care is indicated should the pain continue, as further work up may be performed; this was made clear to the patient, who understands.     Diagnosis:    ICD-10-CM    1. External hemorrhoids  K64.4    2. Non-cardiac chest pain  R07.89        Disposition:  Discharged to  home      Krish Cabrera  1/29/2021   EMERGENCY DEPARTMENT  Scribe Disclosure:  I, Krish Cabrera, am serving as a scribe at 12:04 AM on 1/29/2021 to document services personally performed by Balaji White MD based on my observations and the provider's statements to me.          Balaji White MD  01/29/21 0406

## 2021-01-29 NOTE — ED TRIAGE NOTES
"Patient states she feels like her hemorrhoids have \"flared up\". Also I feel like my \"chest is beating fast\". Both symptoms started on the 24th. Spoke with a nurse yesterday and medication was prescribed for hemorrhoids but has not helped. States the discomfort is still there.   "

## 2021-02-02 LAB — INTERPRETATION ECG - MUSE: NORMAL

## 2021-08-23 PROCEDURE — 99285 EMERGENCY DEPT VISIT HI MDM: CPT | Mod: 25

## 2021-08-23 PROCEDURE — 93005 ELECTROCARDIOGRAM TRACING: CPT

## 2021-08-23 ASSESSMENT — MIFFLIN-ST. JEOR: SCORE: 1703.69

## 2021-08-24 ENCOUNTER — APPOINTMENT (OUTPATIENT)
Dept: GENERAL RADIOLOGY | Facility: CLINIC | Age: 41
End: 2021-08-24
Attending: EMERGENCY MEDICINE
Payer: COMMERCIAL

## 2021-08-24 ENCOUNTER — HOSPITAL ENCOUNTER (EMERGENCY)
Facility: CLINIC | Age: 41
Discharge: HOME | End: 2021-08-24
Attending: EMERGENCY MEDICINE | Admitting: EMERGENCY MEDICINE
Payer: COMMERCIAL

## 2021-08-24 VITALS
RESPIRATION RATE: 18 BRPM | SYSTOLIC BLOOD PRESSURE: 149 MMHG | BODY MASS INDEX: 46.26 KG/M2 | DIASTOLIC BLOOD PRESSURE: 85 MMHG | HEIGHT: 61 IN | WEIGHT: 245 LBS | TEMPERATURE: 99.1 F | HEART RATE: 75 BPM | OXYGEN SATURATION: 98 %

## 2021-08-24 DIAGNOSIS — R07.9 CHEST PAIN, UNSPECIFIED TYPE: ICD-10-CM

## 2021-08-24 LAB
ANION GAP SERPL CALCULATED.3IONS-SCNC: 4 MMOL/L (ref 3–14)
ATRIAL RATE - MUSE: 72 BPM
B-HCG SERPL-ACNC: <1 IU/L (ref 0–5)
BASOPHILS # BLD AUTO: 0.1 10E3/UL (ref 0–0.2)
BASOPHILS NFR BLD AUTO: 1 %
BUN SERPL-MCNC: 10 MG/DL (ref 7–30)
CALCIUM SERPL-MCNC: 9.1 MG/DL (ref 8.5–10.1)
CHLORIDE BLD-SCNC: 105 MMOL/L (ref 94–109)
CO2 SERPL-SCNC: 29 MMOL/L (ref 20–32)
CREAT SERPL-MCNC: 0.73 MG/DL (ref 0.52–1.04)
D DIMER PPP FEU-MCNC: <0.27 UG/ML FEU (ref 0–0.5)
DIASTOLIC BLOOD PRESSURE - MUSE: NORMAL MMHG
EOSINOPHIL # BLD AUTO: 0.2 10E3/UL (ref 0–0.7)
EOSINOPHIL NFR BLD AUTO: 2 %
ERYTHROCYTE [DISTWIDTH] IN BLOOD BY AUTOMATED COUNT: 13.7 % (ref 10–15)
GFR SERPL CREATININE-BSD FRML MDRD: >90 ML/MIN/1.73M2
GLUCOSE BLD-MCNC: 125 MG/DL (ref 70–99)
HCT VFR BLD AUTO: 35 % (ref 35–47)
HGB BLD-MCNC: 11.3 G/DL (ref 11.7–15.7)
HOLD SPECIMEN: NORMAL
IMM GRANULOCYTES # BLD: 0 10E3/UL
IMM GRANULOCYTES NFR BLD: 0 %
INTERPRETATION ECG - MUSE: NORMAL
LYMPHOCYTES # BLD AUTO: 3.2 10E3/UL (ref 0.8–5.3)
LYMPHOCYTES NFR BLD AUTO: 38 %
MCH RBC QN AUTO: 28 PG (ref 26.5–33)
MCHC RBC AUTO-ENTMCNC: 32.3 G/DL (ref 31.5–36.5)
MCV RBC AUTO: 87 FL (ref 78–100)
MONOCYTES # BLD AUTO: 0.5 10E3/UL (ref 0–1.3)
MONOCYTES NFR BLD AUTO: 7 %
NEUTROPHILS # BLD AUTO: 4.4 10E3/UL (ref 1.6–8.3)
NEUTROPHILS NFR BLD AUTO: 52 %
NRBC # BLD AUTO: 0 10E3/UL
NRBC BLD AUTO-RTO: 0 /100
P AXIS - MUSE: 57 DEGREES
PLATELET # BLD AUTO: 291 10E3/UL (ref 150–450)
POTASSIUM BLD-SCNC: 3.7 MMOL/L (ref 3.4–5.3)
PR INTERVAL - MUSE: 182 MS
QRS DURATION - MUSE: 96 MS
QT - MUSE: 380 MS
QTC - MUSE: 416 MS
R AXIS - MUSE: 39 DEGREES
RBC # BLD AUTO: 4.04 10E6/UL (ref 3.8–5.2)
SODIUM SERPL-SCNC: 138 MMOL/L (ref 133–144)
SYSTOLIC BLOOD PRESSURE - MUSE: NORMAL MMHG
T AXIS - MUSE: 33 DEGREES
TROPONIN I SERPL-MCNC: 0.02 UG/L (ref 0–0.04)
VENTRICULAR RATE- MUSE: 72 BPM
WBC # BLD AUTO: 8.3 10E3/UL (ref 4–11)

## 2021-08-24 PROCEDURE — 84702 CHORIONIC GONADOTROPIN TEST: CPT | Performed by: EMERGENCY MEDICINE

## 2021-08-24 PROCEDURE — 80048 BASIC METABOLIC PNL TOTAL CA: CPT | Performed by: EMERGENCY MEDICINE

## 2021-08-24 PROCEDURE — 85025 COMPLETE CBC W/AUTO DIFF WBC: CPT | Performed by: EMERGENCY MEDICINE

## 2021-08-24 PROCEDURE — 36415 COLL VENOUS BLD VENIPUNCTURE: CPT | Performed by: EMERGENCY MEDICINE

## 2021-08-24 PROCEDURE — 85379 FIBRIN DEGRADATION QUANT: CPT | Performed by: EMERGENCY MEDICINE

## 2021-08-24 PROCEDURE — 84484 ASSAY OF TROPONIN QUANT: CPT | Performed by: EMERGENCY MEDICINE

## 2021-08-24 PROCEDURE — 71046 X-RAY EXAM CHEST 2 VIEWS: CPT

## 2021-08-24 ASSESSMENT — ENCOUNTER SYMPTOMS
FEVER: 0
COUGH: 0
ARTHRALGIAS: 1

## 2021-08-24 NOTE — ED PROVIDER NOTES
"  History   Chief Complaint:  Chest Pain       The history is provided by the patient. A  was used (Son interpreted).      Angela Villasenor is a 41 year old female with history of hypertension, type II diabetes, hypertriglyceridemia who presents with chest pain. For the past three days, the patient has been having worsening chest pain with additional pain radiating down her left side and arm. Her chest pain is worsened by breathing and bending over.  Pain has been constant since onset.  She denies any history of smoking. Additionally, she denies fever and cough.  History of high blood pressure and and high cholesterol and diabetes.  She denies history of blood clots.  She has chronic issues with swelling in both of her legs.    Review of Systems   Constitutional: Negative for fever.   Respiratory: Negative for cough.    Cardiovascular: Positive for chest pain and leg swelling.   Musculoskeletal: Positive for arthralgias (left arm).   All other systems reviewed and are negative.    Allergies:  The patient has no known allergies.     Medications:  Naproxen   Nexium   Metformin     Past Medical History:    Diabetes mellitus  External hemorrhoids  H. Pylori infection  Hypertension  Hypertriglyceridemia   Obestiy     Family History:    Mother: hypertension    Social History:  The patient presents to the ED with her son. She sees Rajwinder Hill PA-C, for her primary care.     Physical Exam     Patient Vitals for the past 24 hrs:   BP Temp Temp src Pulse Resp SpO2 Height Weight   08/24/21 0349 (!) 149/85 -- -- 75 18 98 % -- --   08/23/21 2121 (!) 164/99 99.1  F (37.3  C) Oral 83 20 99 % 1.549 m (5' 1\") 111.1 kg (245 lb)       Physical Exam  VS: Reviewed per above  HENT: normal speech  EYES: sclera anicteric  CV: Rate as noted, regular rhythm.   RESP: Effort normal. Breath sounds are normal bilaterally.  GI: no tenderness/rebound/guarding, not distended.  NEURO: Alert, moving all extremities  MSK: No " deformity of the extremities  SKIN: Warm and dry    Emergency Department Course   ECG  ECG taken at 2220, ECG read at 0326  Normal sinus rhythm. Nonspecific T wave abnormality. Abnormal ECG.  Rate 72 bpm. KY interval 182 ms. QRS duration 96 ms. QT/QTc 380/416 ms. P-R-T axes 57 39 33.     Imaging:  XR Chest 2 Views  No evidence of active cardiopulmonary disease.   As per radiology.     Laboratory:  CBC: WBC 8.3, HGB 11.3 (L),      BMP: Glucose 125 (H) o/w WNL (Creatinine 0.73)     Troponin (Collected 0040): 0.020    HCG quantitative blood: <1    D dimer quantitative: <0.27      Emergency Department Course:    Reviewed:  I reviewed nursing notes, vitals, past medical history and care everywhere    Assessments:  0326 I obtained history and examined the patient as noted above.     0405 I rechecked the patient and explained findings.     Disposition:  The patient was discharged to home.       Impression & Plan       Medical Decision Making:  Angela Villasenor presented to the ER with chest pain. CXR did not reveal wide mediastinum and nature of pain not c/w aortic dissection. No radiographic evidence of pneumothorax nor PNA either. Hx and lack of pneumomediastinum on CXR make boerhaave's syndrome unlikely. Based on negative D-dimer, PE was also deemed unlikely. ECG did not show signs of STEMI nor other specific signs of ischemia. Troponin testing was negative, thus no signs of acute MI based on constant symptoms for 3 days. Hx is also not consistent with unstable angina. Nature of pain in conjunction with reassuring ECG and negative troponin makes pericarditis and myocarditis unlikely as well.  She does have cardiac risk factors though her pain today is quite atypical for possible ACS including worse with bending and pleuritic nature of pain.  Encouraged ongoing primary care follow-up.  Return precautions discussed prior to discharge.    Covid-19  Angela Villasenor was evaluated during a global COVID-19 pandemic, which  necessitated consideration that the patient might be at risk for infection with the SARS-CoV-2 virus that causes COVID-19.   Applicable protocols for evaluation were followed during the patient's care.     Diagnosis:    ICD-10-CM    1. Chest pain, unspecified type  R07.9        Scribe Disclosure:  I, Laura Mclean, am serving as a scribe at 3:24 AM on 8/24/2021 to document services personally performed by Garry Earl MD based on my observations and the provider's statements to me.     [facility]         Garry Earl MD  08/24/21 0430

## 2021-08-24 NOTE — ED TRIAGE NOTES
Pt presents for evaluation of left sided chest pain x 3 days. Worsening today. Described as an achy, pressure. Rated 8/10. Does not radiate. Nothing makes it better or worse. Denies any other symptoms.

## 2021-08-25 ENCOUNTER — HOSPITAL ENCOUNTER (EMERGENCY)
Facility: CLINIC | Age: 41
Discharge: HOME | End: 2021-08-25
Attending: EMERGENCY MEDICINE | Admitting: EMERGENCY MEDICINE
Payer: COMMERCIAL

## 2021-08-25 ENCOUNTER — APPOINTMENT (OUTPATIENT)
Dept: CT IMAGING | Facility: CLINIC | Age: 41
End: 2021-08-25
Attending: EMERGENCY MEDICINE
Payer: COMMERCIAL

## 2021-08-25 VITALS
OXYGEN SATURATION: 100 % | RESPIRATION RATE: 16 BRPM | WEIGHT: 240 LBS | HEART RATE: 75 BPM | SYSTOLIC BLOOD PRESSURE: 149 MMHG | TEMPERATURE: 98.6 F | DIASTOLIC BLOOD PRESSURE: 85 MMHG | BODY MASS INDEX: 45.35 KG/M2

## 2021-08-25 DIAGNOSIS — R07.9 LEFT-SIDED CHEST PAIN: Primary | ICD-10-CM

## 2021-08-25 LAB
ALBUMIN SERPL-MCNC: 3.2 G/DL (ref 3.4–5)
ALP SERPL-CCNC: 80 U/L (ref 40–150)
ALT SERPL W P-5'-P-CCNC: 24 U/L (ref 0–50)
ANION GAP SERPL CALCULATED.3IONS-SCNC: 6 MMOL/L (ref 3–14)
AST SERPL W P-5'-P-CCNC: 15 U/L (ref 0–45)
ATRIAL RATE - MUSE: 82 BPM
BASOPHILS # BLD AUTO: 0 10E3/UL (ref 0–0.2)
BASOPHILS NFR BLD AUTO: 1 %
BILIRUB SERPL-MCNC: 0.3 MG/DL (ref 0.2–1.3)
BUN SERPL-MCNC: 9 MG/DL (ref 7–30)
CALCIUM SERPL-MCNC: 8.6 MG/DL (ref 8.5–10.1)
CHLORIDE BLD-SCNC: 107 MMOL/L (ref 94–109)
CO2 SERPL-SCNC: 25 MMOL/L (ref 20–32)
CREAT SERPL-MCNC: 0.68 MG/DL (ref 0.52–1.04)
D DIMER PPP FEU-MCNC: 0.46 UG/ML FEU (ref 0–0.5)
DIASTOLIC BLOOD PRESSURE - MUSE: NORMAL MMHG
EOSINOPHIL # BLD AUTO: 0.1 10E3/UL (ref 0–0.7)
EOSINOPHIL NFR BLD AUTO: 2 %
ERYTHROCYTE [DISTWIDTH] IN BLOOD BY AUTOMATED COUNT: 13.6 % (ref 10–15)
GFR SERPL CREATININE-BSD FRML MDRD: >90 ML/MIN/1.73M2
GLUCOSE BLD-MCNC: 156 MG/DL (ref 70–99)
HCT VFR BLD AUTO: 34.2 % (ref 35–47)
HGB BLD-MCNC: 11.1 G/DL (ref 11.7–15.7)
IMM GRANULOCYTES # BLD: 0 10E3/UL
IMM GRANULOCYTES NFR BLD: 0 %
INTERPRETATION ECG - MUSE: NORMAL
LYMPHOCYTES # BLD AUTO: 2.5 10E3/UL (ref 0.8–5.3)
LYMPHOCYTES NFR BLD AUTO: 31 %
MCH RBC QN AUTO: 27.3 PG (ref 26.5–33)
MCHC RBC AUTO-ENTMCNC: 32.5 G/DL (ref 31.5–36.5)
MCV RBC AUTO: 84 FL (ref 78–100)
MONOCYTES # BLD AUTO: 0.5 10E3/UL (ref 0–1.3)
MONOCYTES NFR BLD AUTO: 6 %
NEUTROPHILS # BLD AUTO: 4.9 10E3/UL (ref 1.6–8.3)
NEUTROPHILS NFR BLD AUTO: 60 %
NRBC # BLD AUTO: 0 10E3/UL
NRBC BLD AUTO-RTO: 0 /100
P AXIS - MUSE: 50 DEGREES
PLATELET # BLD AUTO: 298 10E3/UL (ref 150–450)
POTASSIUM BLD-SCNC: 3.7 MMOL/L (ref 3.4–5.3)
PR INTERVAL - MUSE: 172 MS
PROT SERPL-MCNC: 7.4 G/DL (ref 6.8–8.8)
QRS DURATION - MUSE: 88 MS
QT - MUSE: 352 MS
QTC - MUSE: 411 MS
R AXIS - MUSE: 14 DEGREES
RBC # BLD AUTO: 4.07 10E6/UL (ref 3.8–5.2)
SARS-COV-2 RNA RESP QL NAA+PROBE: NEGATIVE
SODIUM SERPL-SCNC: 138 MMOL/L (ref 133–144)
SYSTOLIC BLOOD PRESSURE - MUSE: NORMAL MMHG
T AXIS - MUSE: 36 DEGREES
TROPONIN I SERPL-MCNC: <0.015 UG/L (ref 0–0.04)
TSH SERPL DL<=0.005 MIU/L-ACNC: 1.68 MU/L (ref 0.4–4)
VENTRICULAR RATE- MUSE: 82 BPM
WBC # BLD AUTO: 8.1 10E3/UL (ref 4–11)

## 2021-08-25 PROCEDURE — 250N000013 HC RX MED GY IP 250 OP 250 PS 637: Performed by: EMERGENCY MEDICINE

## 2021-08-25 PROCEDURE — 84484 ASSAY OF TROPONIN QUANT: CPT | Performed by: EMERGENCY MEDICINE

## 2021-08-25 PROCEDURE — 96374 THER/PROPH/DIAG INJ IV PUSH: CPT | Mod: 59

## 2021-08-25 PROCEDURE — 80053 COMPREHEN METABOLIC PANEL: CPT | Performed by: EMERGENCY MEDICINE

## 2021-08-25 PROCEDURE — 85379 FIBRIN DEGRADATION QUANT: CPT | Performed by: EMERGENCY MEDICINE

## 2021-08-25 PROCEDURE — 250N000011 HC RX IP 250 OP 636: Performed by: EMERGENCY MEDICINE

## 2021-08-25 PROCEDURE — 87635 SARS-COV-2 COVID-19 AMP PRB: CPT | Performed by: EMERGENCY MEDICINE

## 2021-08-25 PROCEDURE — C9803 HOPD COVID-19 SPEC COLLECT: HCPCS

## 2021-08-25 PROCEDURE — 71275 CT ANGIOGRAPHY CHEST: CPT

## 2021-08-25 PROCEDURE — 85025 COMPLETE CBC W/AUTO DIFF WBC: CPT | Performed by: EMERGENCY MEDICINE

## 2021-08-25 PROCEDURE — 36415 COLL VENOUS BLD VENIPUNCTURE: CPT | Performed by: EMERGENCY MEDICINE

## 2021-08-25 PROCEDURE — 250N000009 HC RX 250: Performed by: EMERGENCY MEDICINE

## 2021-08-25 PROCEDURE — 93005 ELECTROCARDIOGRAM TRACING: CPT

## 2021-08-25 PROCEDURE — 99285 EMERGENCY DEPT VISIT HI MDM: CPT | Mod: 25

## 2021-08-25 PROCEDURE — 84443 ASSAY THYROID STIM HORMONE: CPT | Performed by: EMERGENCY MEDICINE

## 2021-08-25 PROCEDURE — 96375 TX/PRO/DX INJ NEW DRUG ADDON: CPT

## 2021-08-25 RX ORDER — IOPAMIDOL 755 MG/ML
79 INJECTION, SOLUTION INTRAVASCULAR ONCE
Status: COMPLETED | OUTPATIENT
Start: 2021-08-25 | End: 2021-08-25

## 2021-08-25 RX ORDER — KETOROLAC TROMETHAMINE 15 MG/ML
15 INJECTION, SOLUTION INTRAMUSCULAR; INTRAVENOUS ONCE
Status: COMPLETED | OUTPATIENT
Start: 2021-08-25 | End: 2021-08-25

## 2021-08-25 RX ADMIN — SODIUM CHLORIDE 100 ML: 9 INJECTION, SOLUTION INTRAVENOUS at 18:31

## 2021-08-25 RX ADMIN — LIDOCAINE HYDROCHLORIDE 30 ML: 20 SOLUTION ORAL; TOPICAL at 17:55

## 2021-08-25 RX ADMIN — FAMOTIDINE 20 MG: 10 INJECTION, SOLUTION INTRAVENOUS at 17:55

## 2021-08-25 RX ADMIN — IOPAMIDOL 79 ML: 755 INJECTION, SOLUTION INTRAVENOUS at 18:31

## 2021-08-25 RX ADMIN — KETOROLAC TROMETHAMINE 15 MG: 15 INJECTION, SOLUTION INTRAMUSCULAR; INTRAVENOUS at 17:55

## 2021-08-25 ASSESSMENT — ENCOUNTER SYMPTOMS
COUGH: 0
SHORTNESS OF BREATH: 1
FEVER: 0
CHEST TIGHTNESS: 1
SORE THROAT: 0

## 2021-08-25 NOTE — ED PROVIDER NOTES
History   Chief Complaint:  Chest Pain       The history is provided by the patient. A  was used (R[place]an).      Angela Villasenor is a 41 year old female with history of hypertension, diabetes, and obesity who presents with chest pain, predominantly in the center and on the left side, as well as shortness of breath. She reports her shortness of breath and constant pain for the past five days, which is worse with exertion and movement. The patient also notes increased chest tightness on her left when she was laying on the left side and then rolled over to the right. She mentions taking ibuprofen and Tylenol for the pain. The patient denies fever, cough, sore throat, or recent ill contacts. She also denies history of heart disease or high cholesterol, but she does have high blood pressure and diabetes. Of note, the patient confirms receiving her second dose of the Covid-19 vaccine a couple weeks ago.     Cardiac Risk Factors:  SEX:              female  Obesity:   Positive  Hypertension:       Positive  Diabetes:             Positive  Lipids:                Negative  Personal history:  Negative    Review of Systems   Constitutional: Negative for fever.   HENT: Negative for sore throat.    Respiratory: Positive for chest tightness and shortness of breath. Negative for cough.    Cardiovascular: Positive for chest pain.   All other systems reviewed and are negative.      Allergies:  The patient has no known allergies.     Medications:  Micronor  Nexium  Metformin  Glycolax   Trandate  Naproxen    Past Medical History:    Diabetes  External hemorrhoids  H. Pylori infection  Hypertension  Hypertriglyceridemia  Obesity     Family History:    Hypertension    Social History:  The patient presents with a family member. They speak Rwandan.     Physical Exam     Patient Vitals for the past 24 hrs:   BP Temp Temp src Pulse Resp SpO2 Weight   08/25/21 2004 -- -- -- -- 16 -- --   08/25/21 1956 (!) 149/85 -- -- 75  -- 100 % --   08/25/21 1954 -- -- -- -- 16 100 % --   08/25/21 1645 (!) 141/81 98.6  F (37  C) Oral 81 16 100 % 108.9 kg (240 lb)     Physical Exam  General: Alert, appears well-developed and well-nourished. Cooperative.     In mild distress  HEENT:  Head:  Atraumatic  Ears:  External ears are normal  Mouth/Throat:  Oropharynx is without erythema or exudate and mucous membranes are moist.   Eyes:   Conjunctivae normal and EOM are normal. No scleral icterus.  CV:  Normal rate, regular rhythm, normal heart sounds and radial pulses are 2+ and symmetric.  No murmur.  Resp:  Breath sounds are clear bilaterally    Non-labored, no retractions or accessory muscle use  GI:  Morbidly obese.  Abdomen is soft, no distension, no tenderness. No rebound or guarding.  No CVA tenderness bilaterally  MS:  Normal range of motion. No edema.    Normal strength in all 4 extremities.     No chest wall tenderness to palpation.     Back atraumatic.    No midline cervical, thoracic, or lumbar tenderness  Skin:  Warm and dry.  No rash or lesions noted.  Neuro: Alert. Normal strength.  GCS: 15  Psych:  Normal mood and affect.    Emergency Department Course   ECG  ECG taken at 1650, ECG read at 1737  Normal sinus rhythm  Nonspecific T wave abnormality  Abnormal ECG   No significant change as compared to prior, dated 8/23/21.  Rate 82 bpm. WV interval 172 ms. QRS duration 88 ms. QT/QTc 352/411 ms. P-R-T axes 50 14 36.     Imaging:    CT Chest Pulmonary Embolism w Contrast  No acute process demonstrated  As per radiology.       Laboratory:    CBC: WBC 8.1, HGB 11.1 (L),    CMP: Glucose 156 (H), Albumin 3.2 (L) o/w WNL (Creatinine 0.68)     Ddimer: 0.46  Troponin (Collected 1653): <0.015    TSH: 1.68  Asymptomatic COVID19 Virus PCR by nasopharyngeal swab: Negative      Emergency Department Course:    Reviewed:  I reviewed nursing notes, vitals, past medical history and care everywhere    Assessments:  1738 I obtained history and examined  the patient as noted above.   1951 I rechecked the patient and explained findings. At this point I feel that the patient is safe for discharge, and the patient agrees.    Interventions:  1755 GI Cocktail 30 mL Oral  1755 Toradol 15 mg IV  1755 Pepcid 20 mg IV    Disposition:  The patient was discharged to home.       Impression & Plan   Medical Decision Making:  Angela Villasenor is a 41 year old female who presents with chest pain.  The work up in the Emergency Department is negative.  I considered a broad differential diagnosis in this patient including life-threatening etiologies such as acute coronary syndrome, myocardial infarction, pulmonary embolism, acute aortic dissection, myocarditis, pericarditis, acute valvular insufficiency amongst others.  Other causes considered for this patient included pneumonia, pneumothorax, chest wall source, pericarditis, pleurisy, esophageal spasm, etc.  No serious etiology for the chest pain were detected today during this visit.  Close follow up with primary care is indicated should the pain continue, as further work up may be performed; this was made clear to the patient, who understands.     Covid-19  Angela Villasenor was evaluated during a global COVID-19 pandemic, which necessitated consideration that the patient might be at risk for infection with the SARS-CoV-2 virus that causes COVID-19.   Applicable protocols for evaluation were followed during the patient's care.   COVID-19 was considered as part of the patient's evaluation. The plan for testing is:  a test was obtained during this visit.    Diagnosis:    ICD-10-CM    1. Left-sided chest pain  R07.9        Discharge Medications:  Discharge Medication List as of 8/25/2021  7:57 PM          Scribe Disclosure:  I, Kacey Eliot, am serving as a scribe at 5:18 PM on 8/25/2021 to document services personally performed by Casa Bowen MD based on my observations and the provider's statements to me.      Casa Bowen,  MD  08/25/21 2260

## 2021-11-01 ENCOUNTER — TELEPHONE (OUTPATIENT)
Dept: CARDIOLOGY | Facility: CLINIC | Age: 41
End: 2021-11-01

## 2021-11-01 NOTE — TELEPHONE ENCOUNTER
Patient was transferred to triage to speak with patient regarding chest pain. Patient was given an appt with Dr Muñoz on 11/4/21 for a new consult per scheduling. Speaking with patient she denies chest pain or shortness of breath at time of call. She is not experiencing any symptoms at time of call. She was seen in ER [facility] 8/25/21 for chest pain with eval done. Work up in the Emergency Department negative. Pt was advised to followup with her PCP. She was referred to see [facility]iology and had Limited Echocardiogram on 10/28/21, and scheduled to see Cardiology with [facility] to review results with Dr Ulloa on 11/9/21. Patient is requesting to see Dr Muñoz instead on 11/4/21 for a second opinion. She would like a call back from Dr Muñoz's team. Advised will forward information to Dr Muñoz nurse team to review.

## 2021-12-30 DIAGNOSIS — Z11.59 ENCOUNTER FOR SCREENING FOR OTHER VIRAL DISEASES: ICD-10-CM

## 2022-01-16 ENCOUNTER — LAB (OUTPATIENT)
Dept: LAB | Facility: CLINIC | Age: 42
End: 2022-01-16
Attending: COLON & RECTAL SURGERY
Payer: COMMERCIAL

## 2022-01-16 DIAGNOSIS — Z11.59 ENCOUNTER FOR SCREENING FOR OTHER VIRAL DISEASES: ICD-10-CM

## 2022-01-16 PROCEDURE — U0005 INFEC AGEN DETEC AMPLI PROBE: HCPCS

## 2022-01-17 LAB — SARS-COV-2 RNA RESP QL NAA+PROBE: NEGATIVE

## 2022-01-19 ENCOUNTER — ANESTHESIA EVENT (OUTPATIENT)
Dept: SURGERY | Facility: AMBULATORY SURGERY CENTER | Age: 42
End: 2022-01-19
Payer: COMMERCIAL

## 2022-01-19 RX ORDER — LACTULOSE 10 G/15ML
SOLUTION ORAL; RECTAL 3 TIMES DAILY
COMMUNITY

## 2022-01-19 RX ORDER — METOPROLOL SUCCINATE 50 MG/1
TABLET, EXTENDED RELEASE ORAL
COMMUNITY
Start: 2022-01-14

## 2022-01-19 RX ORDER — POLYETHYLENE GLYCOL 3350 17 G/17G
1 POWDER, FOR SOLUTION ORAL 3 TIMES DAILY
COMMUNITY

## 2022-01-19 RX ORDER — ROSUVASTATIN CALCIUM 10 MG/1
TABLET, COATED ORAL
COMMUNITY
Start: 2022-01-15

## 2022-01-19 RX ORDER — LISINOPRIL 10 MG/1
10 TABLET ORAL
COMMUNITY
Start: 2021-11-03

## 2022-01-19 ASSESSMENT — MIFFLIN-ST. JEOR: SCORE: 1651.06

## 2022-01-20 ENCOUNTER — ANESTHESIA (OUTPATIENT)
Dept: SURGERY | Facility: AMBULATORY SURGERY CENTER | Age: 42
End: 2022-01-20
Payer: COMMERCIAL

## 2022-01-20 ENCOUNTER — HOSPITAL ENCOUNTER (OUTPATIENT)
Facility: AMBULATORY SURGERY CENTER | Age: 42
End: 2022-01-20
Attending: COLON & RECTAL SURGERY
Payer: COMMERCIAL

## 2022-01-20 VITALS
TEMPERATURE: 97.5 F | HEART RATE: 74 BPM | HEIGHT: 60 IN | SYSTOLIC BLOOD PRESSURE: 116 MMHG | WEIGHT: 238 LBS | BODY MASS INDEX: 46.72 KG/M2 | RESPIRATION RATE: 16 BRPM | DIASTOLIC BLOOD PRESSURE: 60 MMHG | OXYGEN SATURATION: 96 %

## 2022-01-20 DIAGNOSIS — K60.1 CHRONIC POSTERIOR ANAL FISSURE: Primary | ICD-10-CM

## 2022-01-20 LAB — GLUCOSE POCT: 142 MG/DL (ref 70–99)

## 2022-01-20 RX ORDER — BUPIVACAINE HYDROCHLORIDE 2.5 MG/ML
INJECTION, SOLUTION INFILTRATION; PERINEURAL PRN
Status: DISCONTINUED | OUTPATIENT
Start: 2022-01-20 | End: 2022-01-20 | Stop reason: HOSPADM

## 2022-01-20 RX ORDER — ONDANSETRON 4 MG/1
4 TABLET, ORALLY DISINTEGRATING ORAL EVERY 30 MIN PRN
Status: DISCONTINUED | OUTPATIENT
Start: 2022-01-20 | End: 2022-01-21 | Stop reason: HOSPADM

## 2022-01-20 RX ORDER — FENTANYL CITRATE 50 UG/ML
25 INJECTION, SOLUTION INTRAMUSCULAR; INTRAVENOUS
Status: DISCONTINUED | OUTPATIENT
Start: 2022-01-20 | End: 2022-01-21 | Stop reason: HOSPADM

## 2022-01-20 RX ORDER — KETOROLAC TROMETHAMINE 15 MG/ML
15 INJECTION, SOLUTION INTRAMUSCULAR; INTRAVENOUS EVERY 6 HOURS PRN
Status: DISCONTINUED | OUTPATIENT
Start: 2022-01-20 | End: 2022-01-21 | Stop reason: HOSPADM

## 2022-01-20 RX ORDER — LIDOCAINE 40 MG/G
CREAM TOPICAL
Status: DISCONTINUED | OUTPATIENT
Start: 2022-01-20 | End: 2022-01-21 | Stop reason: HOSPADM

## 2022-01-20 RX ORDER — PROPOFOL 10 MG/ML
INJECTION, EMULSION INTRAVENOUS CONTINUOUS PRN
Status: DISCONTINUED | OUTPATIENT
Start: 2022-01-20 | End: 2022-01-20

## 2022-01-20 RX ORDER — PROPOFOL 10 MG/ML
INJECTION, EMULSION INTRAVENOUS PRN
Status: DISCONTINUED | OUTPATIENT
Start: 2022-01-20 | End: 2022-01-20

## 2022-01-20 RX ORDER — ACETAMINOPHEN 325 MG/1
650 TABLET ORAL
Status: DISCONTINUED | OUTPATIENT
Start: 2022-01-20 | End: 2022-01-21 | Stop reason: HOSPADM

## 2022-01-20 RX ORDER — ONDANSETRON 2 MG/ML
4 INJECTION INTRAMUSCULAR; INTRAVENOUS EVERY 30 MIN PRN
Status: DISCONTINUED | OUTPATIENT
Start: 2022-01-20 | End: 2022-01-21 | Stop reason: HOSPADM

## 2022-01-20 RX ORDER — SODIUM CHLORIDE, SODIUM LACTATE, POTASSIUM CHLORIDE, CALCIUM CHLORIDE 600; 310; 30; 20 MG/100ML; MG/100ML; MG/100ML; MG/100ML
INJECTION, SOLUTION INTRAVENOUS CONTINUOUS
Status: DISCONTINUED | OUTPATIENT
Start: 2022-01-20 | End: 2022-01-21 | Stop reason: HOSPADM

## 2022-01-20 RX ORDER — LIDOCAINE HYDROCHLORIDE AND EPINEPHRINE 10; 10 MG/ML; UG/ML
INJECTION, SOLUTION INFILTRATION; PERINEURAL PRN
Status: DISCONTINUED | OUTPATIENT
Start: 2022-01-20 | End: 2022-01-20 | Stop reason: HOSPADM

## 2022-01-20 RX ORDER — ONDANSETRON 4 MG/1
4 TABLET, ORALLY DISINTEGRATING ORAL
Status: DISCONTINUED | OUTPATIENT
Start: 2022-01-20 | End: 2022-01-21 | Stop reason: HOSPADM

## 2022-01-20 RX ORDER — ACETAMINOPHEN 325 MG/1
975 TABLET ORAL ONCE
Status: COMPLETED | OUTPATIENT
Start: 2022-01-20 | End: 2022-01-20

## 2022-01-20 RX ORDER — OXYCODONE HYDROCHLORIDE 5 MG/1
5 TABLET ORAL EVERY 4 HOURS PRN
Status: DISCONTINUED | OUTPATIENT
Start: 2022-01-20 | End: 2022-01-21 | Stop reason: HOSPADM

## 2022-01-20 RX ORDER — FENTANYL CITRATE 50 UG/ML
25 INJECTION, SOLUTION INTRAMUSCULAR; INTRAVENOUS EVERY 5 MIN PRN
Status: DISCONTINUED | OUTPATIENT
Start: 2022-01-20 | End: 2022-01-21 | Stop reason: HOSPADM

## 2022-01-20 RX ORDER — MEPERIDINE HYDROCHLORIDE 25 MG/ML
12.5 INJECTION INTRAMUSCULAR; INTRAVENOUS; SUBCUTANEOUS
Status: DISCONTINUED | OUTPATIENT
Start: 2022-01-20 | End: 2022-01-21 | Stop reason: HOSPADM

## 2022-01-20 RX ORDER — ACETAMINOPHEN 325 MG/1
650 TABLET ORAL EVERY 4 HOURS PRN
Qty: 100 TABLET | Refills: 0 | COMMUNITY
Start: 2022-01-20 | End: 2022-02-03

## 2022-01-20 RX ORDER — ONDANSETRON 2 MG/ML
INJECTION INTRAMUSCULAR; INTRAVENOUS PRN
Status: DISCONTINUED | OUTPATIENT
Start: 2022-01-20 | End: 2022-01-20

## 2022-01-20 RX ORDER — KETAMINE HYDROCHLORIDE 50 MG/ML
INJECTION, SOLUTION INTRAMUSCULAR; INTRAVENOUS PRN
Status: DISCONTINUED | OUTPATIENT
Start: 2022-01-20 | End: 2022-01-20

## 2022-01-20 RX ORDER — FENTANYL CITRATE 50 UG/ML
INJECTION, SOLUTION INTRAMUSCULAR; INTRAVENOUS PRN
Status: DISCONTINUED | OUTPATIENT
Start: 2022-01-20 | End: 2022-01-20

## 2022-01-20 RX ORDER — HYDROMORPHONE HCL IN WATER/PF 6 MG/30 ML
0.2 PATIENT CONTROLLED ANALGESIA SYRINGE INTRAVENOUS EVERY 5 MIN PRN
Status: DISCONTINUED | OUTPATIENT
Start: 2022-01-20 | End: 2022-01-21 | Stop reason: HOSPADM

## 2022-01-20 RX ADMIN — FENTANYL CITRATE 50 MCG: 50 INJECTION, SOLUTION INTRAMUSCULAR; INTRAVENOUS at 08:38

## 2022-01-20 RX ADMIN — SODIUM CHLORIDE, SODIUM LACTATE, POTASSIUM CHLORIDE, CALCIUM CHLORIDE: 600; 310; 30; 20 INJECTION, SOLUTION INTRAVENOUS at 08:27

## 2022-01-20 RX ADMIN — KETAMINE HYDROCHLORIDE 25 MG: 50 INJECTION, SOLUTION INTRAMUSCULAR; INTRAVENOUS at 08:42

## 2022-01-20 RX ADMIN — ACETAMINOPHEN 975 MG: 325 TABLET ORAL at 08:27

## 2022-01-20 RX ADMIN — PROPOFOL 30 MG: 10 INJECTION, EMULSION INTRAVENOUS at 08:40

## 2022-01-20 RX ADMIN — ONDANSETRON 4 MG: 2 INJECTION INTRAMUSCULAR; INTRAVENOUS at 08:47

## 2022-01-20 RX ADMIN — PROPOFOL 100 MCG/KG/MIN: 10 INJECTION, EMULSION INTRAVENOUS at 08:40

## 2022-01-20 ASSESSMENT — MIFFLIN-ST. JEOR: SCORE: 1651.06

## 2022-01-20 NOTE — ANESTHESIA PREPROCEDURE EVALUATION
Anesthesia Pre-Procedure Evaluation    Patient: Angela Villasenor   MRN: [id number] : 1980        Preoperative Diagnosis: Anal fissure [K60.2]    Procedure : Procedure(s):  EXAM UNDER ANESTHESIA WITH BOTOX INJECTION          Past Medical History:   Diagnosis Date     DM2 (diabetes mellitus, type 2) (H)      External hemorrhoids      Gastroesophageal reflux disease      History of H. pylori infection      Hypertension      Hypertriglyceridemia      Obesity       Past Surgical History:   Procedure Laterality Date     COLONOSCOPY        No Known Allergies   Social History     Tobacco Use     Smoking status: Never Smoker     Smokeless tobacco: Never Used   Substance Use Topics     Alcohol use: Never      Wt Readings from Last 1 Encounters:   22 108 kg (238 lb)        Anesthesia Evaluation   Pt has had prior anesthetic.     No history of anesthetic complications       ROS/MED HX  ENT/Pulmonary:       Neurologic:  - neg neurologic ROS     Cardiovascular:     (+) hypertension-----    METS/Exercise Tolerance:     Hematologic:       Musculoskeletal:       GI/Hepatic:     (+) GERD, Asymptomatic on medication,     Renal/Genitourinary:       Endo:     (+) type II DM, Obesity,     Psychiatric/Substance Use:       Infectious Disease:       Malignancy:       Other:            Physical Exam    Airway  airway exam normal      Mallampati: II   TM distance: > 3 FB   Neck ROM: full   Mouth opening: > 3 cm    Respiratory Devices and Support         Dental  no notable dental history         Cardiovascular   cardiovascular exam normal       Rhythm and rate: regular and normal     Pulmonary   pulmonary exam normal        breath sounds clear to auscultation           OUTSIDE LABS:  CBC:   Lab Results   Component Value Date    WBC 8.1 2021    WBC 8.3 2021    HGB 11.1 (L) 2021    HGB 11.3 (L) 2021    HCT 34.2 (L) 2021    HCT 35.0 2021     2021     2021     BMP:   Lab  Results   Component Value Date     08/25/2021     08/24/2021    POTASSIUM 3.7 08/25/2021    POTASSIUM 3.7 08/24/2021    CHLORIDE 107 08/25/2021    CHLORIDE 105 08/24/2021    CO2 25 08/25/2021    CO2 29 08/24/2021    BUN 9 08/25/2021    BUN 10 08/24/2021    CR 0.68 08/25/2021    CR 0.73 08/24/2021     (H) 08/25/2021     (H) 08/24/2021     COAGS: No results found for: PTT, INR, FIBR  POC: No results found for: BGM, HCG, HCGS  HEPATIC:   Lab Results   Component Value Date    ALBUMIN 3.2 (L) 08/25/2021    PROTTOTAL 7.4 08/25/2021    ALT 24 08/25/2021    AST 15 08/25/2021    ALKPHOS 80 08/25/2021    BILITOTAL 0.3 08/25/2021     OTHER:   Lab Results   Component Value Date    KYLAH 8.6 08/25/2021    LIPASE 79 01/05/2021    TSH 1.68 08/25/2021       Anesthesia Plan    ASA Status:  3   NPO Status:  NPO Appropriate    Anesthesia Type: MAC.     - Reason for MAC: immobility needed, straight local not clinically adequate              Consents    Anesthesia Plan(s) and associated risks, benefits, and realistic alternatives discussed. Questions answered and patient/representative(s) expressed understanding.    - Discussed:     - Discussed with:  Patient      - Extended Intubation/Ventilatory Support Discussed: No.      - Patient is DNR/DNI Status: No    Use of blood products discussed: No .     Postoperative Care    Pain management: IV analgesics, Oral pain medications.   PONV prophylaxis: Ondansetron (or other 5HT-3), Dexamethasone or Solumedrol     Comments:                Malcolm Nelson MD

## 2022-01-20 NOTE — OP NOTE
[facility]PERATIVE [facility]    DATE OF SERVICE: 1/20/2022     PROCEDURE NAME:   1. Rectal exam under anesthesia  2. Injection of 50 units of Botox to the internal anal sphincter     PRE-PROCEDURE DIAGNOSIS: Chronic anal fissure     POST-PROCEDURE DIAGNOSIS: Same     SURGEON: Misti Juan MD     ASSISTANT: None     FINDINGS: There was a very deep posterior midline anal fissure.  There were also nonthrombosed mixed internal and external hemorrhoids.     ESTIMATED BLOOD LOSS: 0 mL     ANESTHESIA: MAC     SPECIMEN: None.     INDICATIONS FOR PROCEDURE:  Angela Villasenor is a 42 year old female presenting with a chronic anal pain.  The risks and benefits of surgery were discussed with the patient including infection, abscess recurrence, need for further operative interventions, possible damage to the sphincter and incontinence (or changes in continence) and increased pain and bleeding were discussed with the patient. Alternatives were also discussed. The patient agreed to proceed with surgery and informed consent was obtained.  We specifically discussed the 50 to 70% success rate with the injection of Botox to the internal anal sphincter.  We also discussed the approximately 3% rate of temporary fecal incontinence which could last around 4 months.    DESCRIPTION OF PROCEDURE:  The patient was brought to the operating room.  They were placed onto the operating room table in the prone position.  All bony prominences were appropriately padded.  The patient was given IV sedation.  The buttocks were effaced with tape and the patient was prepped and draped in the usual sterile fashion.  A timeout verifying the patient's name, the procedure, and surgical team was performed.  All members of the team were given a chance to speak up.    A terminal pudendal nerve block was performed using a mix of 1% lidocaine with epinephrine and 0.25% bupivacaine plain.  A total of 30 mL was used.  Examination of the perianal skin  revealed circumferential external hemorrhoid.  A digital rectal examination revealed circumferential internal hemorrhoids as well as a deep posterior midline anal fissure.  A lubricated Reeves bivalve anoscope was placed into the anal canal.  This demonstrated large internal hemorrhoids as well as a deep chronic posterior midline anal fissure.     Based on these findings I proceeded to injection of Botox into the internal anal sphincter.  Total of 50 units of Botox was injected directly into the internal sphincter muscle at the location of the fissure, the left lateral position, and the right lateral position.     This completed our planned procedure.  All sponge, needle and instrument counts were correct at the end of the procedure. The patient tolerated the procedure well and was awakened from anesthesia without difficulty, and transferred to the recovery room in stable condition.    COMPLICATIONS: None apparent    DISPOSITION: Stable, extubated, to PACU    ATTESTATION: I, Misti Juan, was present and scrubbed for the duration of the procedure    Misti Juan MD, LEVI  [facility]  Office: [phone]  1/20/2022 9:12 AM

## 2022-01-20 NOTE — DISCHARGE INSTRUCTIONS
Patient Discharge Instructions:    You have just undergone anorectal surgery.  Here are a few things to expect after your surgery:    You may notice a little leakage for the first day after surgery. This is normal and should go away within a few hours.     You do not have any cuts on the inside or outside so if you have any pain, its OK to take Tylenol 650mg by mouth every 4 hours as needed.    Continue to take Metamucil on a daily basis.    Lastly, if you have any questions or concerns - PLEASE CALL ([phone])!  Our office has someone on call 24 hours a day.  If your question is one that can wait until normal business hours (Mon-Fri 8:30AM-5PM), it is better to wait until the daytime as someone more familiar with your care will be able to help you.  However, if it is an emergency, the on-call surgeon will be able to give you good advice.    Misti Juan MD, LEVI  [facility]  Office: [phone]  1/20/2022 9:09 AM    You have received 975 mg of Acetaminophen (Tylenol) at 8:27 AM. Please do not take an additional dose of Tylenol until after 2:27 PM     Do not exceed 4,000 mg of acetaminophen during a 24 hour period and keep in mind that acetaminophen can also be found in many over-the-counter cold medications as well as narcotics that may be given for pain.

## 2022-01-20 NOTE — PROGRESS NOTES
Patient's blood glucose was 108 at 8:14AM via her own monitor on her arm.  Leanne Rayo RN on 1/20/2022 at 8:30 AM

## 2022-01-20 NOTE — PROGRESS NOTES
Offered patient pregnancy test.  Explained there are risks associated with anesthesia and pregnancy.  Patient verbalizes understanding, denies possibility of pregnancy and declines pregnancy test.  Leanne Rayo RN on 1/20/2022 at 8:03 AM

## 2022-01-20 NOTE — ANESTHESIA POSTPROCEDURE EVALUATION
Patient: Angela Villasenor    Procedure: Procedure(s):  EXAM UNDER ANESTHESIA WITH BOTOX INJECTION       Diagnosis:Anal fissure [K60.2]  Diagnosis Additional Information: No value filed.    Anesthesia Type:  MAC    Note:  Disposition: Outpatient   Postop Pain Control: Uneventful            Sign Out: Well controlled pain   PONV: No   Neuro/Psych: Uneventful            Sign Out: Acceptable/Baseline neuro status   Airway/Respiratory: Uneventful            Sign Out: Acceptable/Baseline resp. status   CV/Hemodynamics: Uneventful            Sign Out: Acceptable CV status; No obvious hypovolemia; No obvious fluid overload   Other NRE: NONE   DID A NON-ROUTINE EVENT OCCUR? No           Last vitals:  Vitals Value Taken Time   /60 01/20/22 1015   Temp 97.5  F (36.4  C) 01/20/22 0908   Pulse 69 01/20/22 1027   Resp 16 01/20/22 1015   SpO2 99 % 01/20/22 1027   Vitals shown include unvalidated device data.    Electronically Signed By: Malcolm Nelson MD  January 20, 2022  10:30 AM

## 2022-01-20 NOTE — ANESTHESIA CARE TRANSFER NOTE
Patient: Angela Villasenor    Procedure: Procedure(s):  EXAM UNDER ANESTHESIA WITH BOTOX INJECTION       Diagnosis: Anal fissure [K60.2]  Diagnosis Additional Information: No value filed.    Anesthesia Type:   MAC     Note:    Oropharynx: oropharynx clear of all foreign objects  Level of Consciousness: drowsy  Oxygen Supplementation: face mask  Level of Supplemental Oxygen (L/min / FiO2): 10  Independent Airway: airway patency satisfactory and stable  Dentition: dentition unchanged  Vital Signs Stable: post-procedure vital signs reviewed and stable  Report to RN Given: handoff report given  Patient transferred to: Phase II    Handoff Report: Identifed the Patient, Identified the Reponsible Provider, Reviewed the pertinent medical history, Discussed the surgical course, Reviewed Intra-OP anesthesia mangement and issues during anesthesia, Set expectations for post-procedure period and Allowed opportunity for questions and acknowledgement of understanding      Vitals:  Vitals Value Taken Time   /60 01/20/22 0908   Temp 97.5  F (36.4  C) 01/20/22 0908   Pulse 86 01/20/22 0909   Resp 24 01/20/22 0908   SpO2 100 % 01/20/22 0909   Vitals shown include unvalidated device data.    Electronically Signed By: RICHARD Mariscal CRNA  January 20, 2022  9:12 AM

## 2023-05-15 ENCOUNTER — OFFICE VISIT (OUTPATIENT)
Dept: URGENT CARE | Facility: URGENT CARE | Age: 43
End: 2023-05-15
Payer: COMMERCIAL

## 2023-05-15 VITALS
TEMPERATURE: 98.5 F | OXYGEN SATURATION: 100 % | SYSTOLIC BLOOD PRESSURE: 127 MMHG | DIASTOLIC BLOOD PRESSURE: 82 MMHG | HEART RATE: 69 BPM | WEIGHT: 230 LBS | BODY MASS INDEX: 44.92 KG/M2

## 2023-05-15 DIAGNOSIS — R07.0 THROAT PAIN: Primary | ICD-10-CM

## 2023-05-15 LAB
DEPRECATED S PYO AG THROAT QL EIA: NEGATIVE
GROUP A STREP BY PCR: NOT DETECTED

## 2023-05-15 PROCEDURE — 99203 OFFICE O/P NEW LOW 30 MIN: CPT | Performed by: FAMILY MEDICINE

## 2023-05-15 PROCEDURE — 87651 STREP A DNA AMP PROBE: CPT | Performed by: FAMILY MEDICINE

## 2023-05-15 NOTE — PROGRESS NOTES
SUBJECTIVE: Angela Villasenor is a 43 year old female presenting with a chief complaint of sore throat.  Onset of symptoms was day(s) ago.  Predisposing factors include ill contact: Family member .    Past Medical History:   Diagnosis Date     DM2 (diabetes mellitus, type 2) (H)      External hemorrhoids      Gastroesophageal reflux disease      History of H. pylori infection      Hypertension      Hypertriglyceridemia      Obesity      No Known Allergies  Social History     Tobacco Use     Smoking status: Never     Smokeless tobacco: Never   Vaping Use     Vaping status: Not on file   Substance Use Topics     Alcohol use: Never       ROS:  SKIN: no rash  GI: no vomiting    OBJECTIVE:  /82   Pulse 69   Temp 98.5  F (36.9  C) (Oral)   Wt 104.3 kg (230 lb)   SpO2 100%   BMI 44.92 kg/m  GENERAL APPEARANCE: healthy, alert and no distress  EYES: EOMI,  PERRL, conjunctiva clear  HENT: ear canals and TM's normal.  Nose and mouth without ulcers, erythema or lesions  RESP: lungs clear to auscultation - no rales, rhonchi or wheezes  SKIN: no suspicious lesions or rashes      ICD-10-CM    1. Throat pain  R07.0 Streptococcus A Rapid Screen w/Reflex to PCR     Group A Streptococcus PCR Throat Swab          Fluids/Rest, f/u if worse/not any better

## 2023-09-18 ENCOUNTER — HOSPITAL ENCOUNTER (EMERGENCY)
Facility: CLINIC | Age: 43
Discharge: HOME | End: 2023-09-18
Attending: EMERGENCY MEDICINE | Admitting: EMERGENCY MEDICINE
Payer: COMMERCIAL

## 2023-09-18 ENCOUNTER — APPOINTMENT (OUTPATIENT)
Dept: GENERAL RADIOLOGY | Facility: CLINIC | Age: 43
End: 2023-09-18
Attending: EMERGENCY MEDICINE
Payer: COMMERCIAL

## 2023-09-18 VITALS
HEART RATE: 64 BPM | WEIGHT: 218.26 LBS | TEMPERATURE: 97.1 F | RESPIRATION RATE: 20 BRPM | BODY MASS INDEX: 42.63 KG/M2 | SYSTOLIC BLOOD PRESSURE: 193 MMHG | DIASTOLIC BLOOD PRESSURE: 86 MMHG | OXYGEN SATURATION: 100 %

## 2023-09-18 DIAGNOSIS — M54.9 BACK PAIN, UNSPECIFIED BACK LOCATION, UNSPECIFIED BACK PAIN LATERALITY, UNSPECIFIED CHRONICITY: ICD-10-CM

## 2023-09-18 DIAGNOSIS — R06.00 DYSPNEA, UNSPECIFIED TYPE: ICD-10-CM

## 2023-09-18 DIAGNOSIS — I10 HYPERTENSION, UNSPECIFIED TYPE: ICD-10-CM

## 2023-09-18 DIAGNOSIS — R07.9 CHEST PAIN, UNSPECIFIED TYPE: ICD-10-CM

## 2023-09-18 LAB
ALBUMIN SERPL BCG-MCNC: 4.1 G/DL (ref 3.5–5.2)
ALBUMIN UR-MCNC: NEGATIVE MG/DL
ALP SERPL-CCNC: 99 U/L (ref 35–104)
ALT SERPL W P-5'-P-CCNC: 14 U/L (ref 0–50)
ANION GAP SERPL CALCULATED.3IONS-SCNC: 10 MMOL/L (ref 7–15)
APPEARANCE UR: CLEAR
AST SERPL W P-5'-P-CCNC: 23 U/L (ref 0–45)
ATRIAL RATE - MUSE: 66 BPM
BASOPHILS # BLD AUTO: 0 10E3/UL (ref 0–0.2)
BASOPHILS NFR BLD AUTO: 1 %
BILIRUB DIRECT SERPL-MCNC: <0.2 MG/DL (ref 0–0.3)
BILIRUB SERPL-MCNC: 0.2 MG/DL
BILIRUB UR QL STRIP: NEGATIVE
BUN SERPL-MCNC: 11.9 MG/DL (ref 6–20)
CALCIUM SERPL-MCNC: 9.2 MG/DL (ref 8.6–10)
CHLORIDE SERPL-SCNC: 99 MMOL/L (ref 98–107)
COLOR UR AUTO: ABNORMAL
CREAT SERPL-MCNC: 1.22 MG/DL (ref 0.51–0.95)
D DIMER PPP FEU-MCNC: 0.4 UG/ML FEU (ref 0–0.5)
DEPRECATED HCO3 PLAS-SCNC: 23 MMOL/L (ref 22–29)
DIASTOLIC BLOOD PRESSURE - MUSE: NORMAL MMHG
EGFRCR SERPLBLD CKD-EPI 2021: 55 ML/MIN/1.73M2
EOSINOPHIL # BLD AUTO: 0.1 10E3/UL (ref 0–0.7)
EOSINOPHIL NFR BLD AUTO: 2 %
ERYTHROCYTE [DISTWIDTH] IN BLOOD BY AUTOMATED COUNT: 14.7 % (ref 10–15)
FLUAV RNA SPEC QL NAA+PROBE: NEGATIVE
FLUBV RNA RESP QL NAA+PROBE: NEGATIVE
GLUCOSE SERPL-MCNC: 95 MG/DL (ref 70–99)
GLUCOSE UR STRIP-MCNC: NEGATIVE MG/DL
HCG SERPL QL: NEGATIVE
HCT VFR BLD AUTO: 33.6 % (ref 35–47)
HGB BLD-MCNC: 10.9 G/DL (ref 11.7–15.7)
HGB UR QL STRIP: NEGATIVE
HOLD SPECIMEN: NORMAL
IMM GRANULOCYTES # BLD: 0 10E3/UL
IMM GRANULOCYTES NFR BLD: 0 %
INTERPRETATION ECG - MUSE: NORMAL
KETONES UR STRIP-MCNC: NEGATIVE MG/DL
LEUKOCYTE ESTERASE UR QL STRIP: NEGATIVE
LIPASE SERPL-CCNC: 31 U/L (ref 13–60)
LYMPHOCYTES # BLD AUTO: 3.4 10E3/UL (ref 0.8–5.3)
LYMPHOCYTES NFR BLD AUTO: 42 %
MCH RBC QN AUTO: 25.9 PG (ref 26.5–33)
MCHC RBC AUTO-ENTMCNC: 32.4 G/DL (ref 31.5–36.5)
MCV RBC AUTO: 80 FL (ref 78–100)
MONOCYTES # BLD AUTO: 0.6 10E3/UL (ref 0–1.3)
MONOCYTES NFR BLD AUTO: 8 %
MUCOUS THREADS #/AREA URNS LPF: PRESENT /LPF
NEUTROPHILS # BLD AUTO: 3.9 10E3/UL (ref 1.6–8.3)
NEUTROPHILS NFR BLD AUTO: 47 %
NITRATE UR QL: NEGATIVE
NRBC # BLD AUTO: 0 10E3/UL
NRBC BLD AUTO-RTO: 0 /100
NT-PROBNP SERPL-MCNC: 41 PG/ML (ref 0–450)
P AXIS - MUSE: 55 DEGREES
PH UR STRIP: 5 [PH] (ref 5–7)
PLATELET # BLD AUTO: 316 10E3/UL (ref 150–450)
POTASSIUM SERPL-SCNC: 3.9 MMOL/L (ref 3.4–5.3)
PR INTERVAL - MUSE: 182 MS
PROT SERPL-MCNC: 7.4 G/DL (ref 6.4–8.3)
QRS DURATION - MUSE: 86 MS
QT - MUSE: 398 MS
QTC - MUSE: 417 MS
R AXIS - MUSE: 30 DEGREES
RBC # BLD AUTO: 4.21 10E6/UL (ref 3.8–5.2)
RBC URINE: <1 /HPF
RSV RNA SPEC NAA+PROBE: NEGATIVE
SARS-COV-2 RNA RESP QL NAA+PROBE: NEGATIVE
SODIUM SERPL-SCNC: 132 MMOL/L (ref 136–145)
SP GR UR STRIP: 1.01 (ref 1–1.03)
SYSTOLIC BLOOD PRESSURE - MUSE: NORMAL MMHG
T AXIS - MUSE: 45 DEGREES
TROPONIN T SERPL HS-MCNC: 7 NG/L
TROPONIN T SERPL HS-MCNC: 7 NG/L
UROBILINOGEN UR STRIP-MCNC: NORMAL MG/DL
VENTRICULAR RATE- MUSE: 66 BPM
WBC # BLD AUTO: 8.1 10E3/UL (ref 4–11)
WBC URINE: 1 /HPF

## 2023-09-18 PROCEDURE — 84484 ASSAY OF TROPONIN QUANT: CPT | Performed by: EMERGENCY MEDICINE

## 2023-09-18 PROCEDURE — 71046 X-RAY EXAM CHEST 2 VIEWS: CPT

## 2023-09-18 PROCEDURE — 82248 BILIRUBIN DIRECT: CPT | Performed by: EMERGENCY MEDICINE

## 2023-09-18 PROCEDURE — 36415 COLL VENOUS BLD VENIPUNCTURE: CPT | Performed by: EMERGENCY MEDICINE

## 2023-09-18 PROCEDURE — 87637 SARSCOV2&INF A&B&RSV AMP PRB: CPT | Performed by: EMERGENCY MEDICINE

## 2023-09-18 PROCEDURE — 85025 COMPLETE CBC W/AUTO DIFF WBC: CPT | Performed by: EMERGENCY MEDICINE

## 2023-09-18 PROCEDURE — 99285 EMERGENCY DEPT VISIT HI MDM: CPT | Mod: 25

## 2023-09-18 PROCEDURE — 84703 CHORIONIC GONADOTROPIN ASSAY: CPT | Performed by: EMERGENCY MEDICINE

## 2023-09-18 PROCEDURE — 93005 ELECTROCARDIOGRAM TRACING: CPT

## 2023-09-18 PROCEDURE — 96360 HYDRATION IV INFUSION INIT: CPT

## 2023-09-18 PROCEDURE — 258N000003 HC RX IP 258 OP 636: Performed by: EMERGENCY MEDICINE

## 2023-09-18 PROCEDURE — 83880 ASSAY OF NATRIURETIC PEPTIDE: CPT | Performed by: EMERGENCY MEDICINE

## 2023-09-18 PROCEDURE — 80048 BASIC METABOLIC PNL TOTAL CA: CPT | Performed by: EMERGENCY MEDICINE

## 2023-09-18 PROCEDURE — 80053 COMPREHEN METABOLIC PANEL: CPT | Performed by: EMERGENCY MEDICINE

## 2023-09-18 PROCEDURE — 83690 ASSAY OF LIPASE: CPT | Performed by: EMERGENCY MEDICINE

## 2023-09-18 PROCEDURE — 250N000013 HC RX MED GY IP 250 OP 250 PS 637: Performed by: EMERGENCY MEDICINE

## 2023-09-18 PROCEDURE — 96361 HYDRATE IV INFUSION ADD-ON: CPT

## 2023-09-18 PROCEDURE — 85379 FIBRIN DEGRADATION QUANT: CPT | Performed by: EMERGENCY MEDICINE

## 2023-09-18 PROCEDURE — 81001 URINALYSIS AUTO W/SCOPE: CPT | Performed by: EMERGENCY MEDICINE

## 2023-09-18 RX ORDER — OXYCODONE HYDROCHLORIDE 5 MG/1
5 TABLET ORAL EVERY 6 HOURS PRN
Qty: 6 TABLET | Refills: 0 | Status: SHIPPED | OUTPATIENT
Start: 2023-09-18 | End: 2023-12-07

## 2023-09-18 RX ORDER — OXYCODONE HYDROCHLORIDE 5 MG/1
5 TABLET ORAL ONCE
Status: COMPLETED | OUTPATIENT
Start: 2023-09-18 | End: 2023-09-18

## 2023-09-18 RX ADMIN — SODIUM CHLORIDE 1000 ML: 9 INJECTION, SOLUTION INTRAVENOUS at 03:08

## 2023-09-18 RX ADMIN — OXYCODONE HYDROCHLORIDE 5 MG: 5 TABLET ORAL at 03:08

## 2023-09-18 ASSESSMENT — ACTIVITIES OF DAILY LIVING (ADL)
ADLS_ACUITY_SCORE: 35
ADLS_ACUITY_SCORE: 35

## 2023-09-18 NOTE — ED TRIAGE NOTES
"\"Muscle cramps in her back and my side. And I can feel it in my bones. And its hard to breathe.\" Started 3 hours PTA. Tylenol and muscle relaxer last 2300. Denies fever or n/v/d        "

## 2023-09-18 NOTE — ED PROVIDER NOTES
History     Chief Complaint:  Shortness of Breath       The history is provided by the patient and a relative. The history is limited by a language barrier. A  was used.      Angela Villasenor is a 43 year old female with history of obesity, cardiomyopathy, and hypertension who presents with chest pain and dyspnea amongst other symptoms.  Patient reports around 3 hours prior to arrival developing a back cramp sensation in her upper back that radiates to the front of her chest wall.  She reports accompanying pain with deep inspiration.  She took a Tylenol as well as a muscle relaxant with minimal relief.  She denies any fever, chills, abdominal pain, nausea, vomiting, urinary symptoms.  She denies any recent trauma.  She does report a history of driving to [place] roughly a month ago though denies any other prolonged travel or immobilization.  No history of cancer or blood clots.      Independent Historian:   None - Patient Only    Review of External Notes:    6/14/23 cardiology note      Medications:    Nexium   Zestril  Metformin   Toprol  Crestor     Past Medical History:    DM2 (diabetes mellitus, type 2) (H)  External hemorrhoids  Gastroesophageal reflux disease  History of H. pylori infection  Hypertension  Hypertriglyceridemia  Obesity  Cardiomyopathy     Past Surgical History:    Botox injection      Physical Exam   Patient Vitals for the past 24 hrs:   BP Temp Temp src Pulse Resp SpO2 Weight   09/18/23 0150 (!) 207/101 97.1  F (36.2  C) Temporal 71 20 100 % 99 kg (218 lb 4.1 oz)        Physical Exam  Nursing note and vitals reviewed.  Constitutional: Well nourished.   Eyes: Conjunctiva normal.  Pupils are equal, round, and reactive to light.   ENT: Nose normal. Mucous membranes pink and moist.    Neck: Normal range of motion.  CVS: Normal rate, regular rhythm.  Normal heart sounds.  No murmur.  Pulmonary: Lungs clear to auscultation bilaterally. No wheezes/rales/rhonchi.   GI: Abdomen  soft. Nontender, nondistended. No rigidity or guarding.  No CVA tenderness  MSK: No calf tenderness or swelling. No c/t/l bony tenderness. Bilateral paraspinal thoracic tenderness L>R; no overlying warmth/erythema/rash  Neuro: Alert. Follows simple commands. Sensation intact x 4.   Skin: Skin is warm and dry. No rash noted.   Psychiatric: Normal affect.       Emergency Department Course   ECG  ECG results from 09/18/23   EKG 12 lead     Value    Systolic Blood Pressure     Diastolic Blood Pressure     Ventricular Rate 66    Atrial Rate 66    TX Interval 182    QRS Duration 86        QTc 417    P Axis 55    R AXIS 30    T Axis 45    Interpretation ECG      Sinus rhythm  Normal ECG  When compared with ECG of 25-AUG-2021 16:50,  Nonspecific T wave abnormality no longer evident in Inferior leads  T wave inversion no longer evident in Anterior leads          Imaging:  XR Chest 2 Views   Final Result   IMPRESSION: Negative chest.         Report per radiology    Laboratory:  Labs Ordered and Resulted from Time of ED Arrival to Time of ED Departure   BASIC METABOLIC PANEL - Abnormal       Result Value    Sodium 132 (*)     Potassium 3.9      Chloride 99      Carbon Dioxide (CO2) 23      Anion Gap 10      Urea Nitrogen 11.9      Creatinine 1.22 (*)     Calcium 9.2      Glucose 95      GFR Estimate 55 (*)    CBC WITH PLATELETS AND DIFFERENTIAL - Abnormal    WBC Count 8.1      RBC Count 4.21      Hemoglobin 10.9 (*)     Hematocrit 33.6 (*)     MCV 80      MCH 25.9 (*)     MCHC 32.4      RDW 14.7      Platelet Count 316      % Neutrophils 47      % Lymphocytes 42      % Monocytes 8      % Eosinophils 2      % Basophils 1      % Immature Granulocytes 0      NRBCs per 100 WBC 0      Absolute Neutrophils 3.9      Absolute Lymphocytes 3.4      Absolute Monocytes 0.6      Absolute Eosinophils 0.1      Absolute Basophils 0.0      Absolute Immature Granulocytes 0.0      Absolute NRBCs 0.0     ROUTINE UA WITH MICROSCOPIC -  Abnormal    Color Urine Straw      Appearance Urine Clear      Glucose Urine Negative      Bilirubin Urine Negative      Ketones Urine Negative      Specific Gravity Urine 1.010      Blood Urine Negative      pH Urine 5.0      Protein Albumin Urine Negative      Urobilinogen Urine Normal      Nitrite Urine Negative      Leukocyte Esterase Urine Negative      Mucus Urine Present (*)     RBC Urine <1      WBC Urine 1     TROPONIN T, HIGH SENSITIVITY - Normal    Troponin T, High Sensitivity 7     HCG QUALITATIVE PREGNANCY - Normal    hCG Serum Qualitative Negative     INFLUENZA A/B, RSV, & SARS-COV2 PCR - Normal    Influenza A PCR Negative      Influenza B PCR Negative      RSV PCR Negative      SARS CoV2 PCR Negative     D DIMER QUANTITATIVE - Normal    D-Dimer Quantitative 0.40     HEPATIC FUNCTION PANEL - Normal    Protein Total 7.4      Albumin 4.1      Bilirubin Total 0.2      Alkaline Phosphatase 99      AST 23      ALT 14      Bilirubin Direct <0.20     LIPASE - Normal    Lipase 31     NT PROBNP INPATIENT - Normal    N terminal Pro BNP Inpatient 41     TROPONIN T, HIGH SENSITIVITY - Normal    Troponin T, High Sensitivity 7           Emergency Department Course & Assessments:  Interventions:  Medications   sodium chloride 0.9% BOLUS 1,000 mL (1,000 mLs Intravenous $New Bag 9/18/23 0308)   oxyCODONE (ROXICODONE) tablet 5 mg (5 mg Oral $Given 9/18/23 0308)        Independent Interpretation (X-rays, CTs, rhythm strip):  None    Consultations/Discussion of Management or Tests:  None        Social Determinants of Health affecting care:   None    Disposition:  The patient was discharged to home.     Impression & Plan    Medical Decision Making:  Patient is a 43-year-old female presenting with a myriad of complaints predominately chest pain, dyspnea, back pain.  She is quite hypertensive on arrival though otherwise neurologically intact.  Her work-up was initiated from triage given prolonged wait times.  EKG without  focal ischemia or underlying arrhythmia.  High-sensitivity screening troponin negative x2.  Clinically lower suspicion for ACS at this point in time.  D-dimer negative, clinically doubt PE.  Chest x-ray without focal pneumonia, fluid overload, widened mediastinum or pneumothorax.  She tested negative for COVID-19/influenza/RSV. Patient has no abdominal tenderness on exam and I doubt intra-abdominal source to explain her presentation.  LFTs/lipase reassuring.  She is not pregnant today.  No profound anemia or significant electrolyte derangements other than mild renal insufficiency, I suspect more prerenal.  She does have reproducible tenderness to the parathoracic region.  Stronger suspicion that her presentation is more musculoskeletal in nature.  I do not feel further advanced imaging is warranted at this point in time.  Patient reported symptom improvement after analgesia provided during her time in the ED.  We will plan for analgesia on discharge with close PCP follow-up and return precautions given. Also recommended BP diary to assist PCP in possible medication adjustments.       Diagnosis:    ICD-10-CM    1. Chest pain, unspecified type  R07.9       2. Dyspnea, unspecified type  R06.00       3. Back pain, unspecified back location, unspecified back pain laterality, unspecified chronicity  M54.9       4. Hypertension, unspecified type  I10            Discharge Medications:  New Prescriptions    OXYCODONE (ROXICODONE) 5 MG TABLET    Take 1 tablet (5 mg) by mouth every 6 hours as needed for severe pain          9/18/2023   Shruthi Waldron, Shruthi Linn,   09/18/23 0609

## 2023-10-17 ENCOUNTER — TRANSCRIBE ORDERS (OUTPATIENT)
Dept: OTHER | Age: 43
End: 2023-10-17

## 2023-10-17 DIAGNOSIS — E11.29 TYPE 2 DIABETES MELLITUS WITH MICROALBUMINURIA (H): ICD-10-CM

## 2023-10-17 DIAGNOSIS — E66.01 MORBID OBESITY (H): Primary | ICD-10-CM

## 2023-10-17 DIAGNOSIS — R80.9 TYPE 2 DIABETES MELLITUS WITH MICROALBUMINURIA (H): ICD-10-CM

## 2023-11-17 ENCOUNTER — HOSPITAL ENCOUNTER (EMERGENCY)
Facility: CLINIC | Age: 43
Discharge: HOME | End: 2023-11-17
Attending: EMERGENCY MEDICINE | Admitting: EMERGENCY MEDICINE
Payer: MEDICAID

## 2023-11-17 VITALS
TEMPERATURE: 98.7 F | DIASTOLIC BLOOD PRESSURE: 94 MMHG | HEART RATE: 87 BPM | RESPIRATION RATE: 18 BRPM | SYSTOLIC BLOOD PRESSURE: 168 MMHG | OXYGEN SATURATION: 100 %

## 2023-11-17 DIAGNOSIS — J10.1 INFLUENZA B: ICD-10-CM

## 2023-11-17 LAB
FLUAV RNA SPEC QL NAA+PROBE: NEGATIVE
FLUBV RNA RESP QL NAA+PROBE: POSITIVE
RSV RNA SPEC NAA+PROBE: NEGATIVE
SARS-COV-2 RNA RESP QL NAA+PROBE: NEGATIVE

## 2023-11-17 PROCEDURE — 87637 SARSCOV2&INF A&B&RSV AMP PRB: CPT | Performed by: EMERGENCY MEDICINE

## 2023-11-17 PROCEDURE — 99283 EMERGENCY DEPT VISIT LOW MDM: CPT

## 2023-11-17 RX ORDER — ONDANSETRON 4 MG/1
4 TABLET, ORALLY DISINTEGRATING ORAL EVERY 8 HOURS PRN
Qty: 15 TABLET | Refills: 0 | Status: SHIPPED | OUTPATIENT
Start: 2023-11-17 | End: 2023-11-17

## 2023-11-17 RX ORDER — ONDANSETRON 4 MG/1
4 TABLET, ORALLY DISINTEGRATING ORAL EVERY 8 HOURS PRN
Qty: 15 TABLET | Refills: 0 | Status: SHIPPED | OUTPATIENT
Start: 2023-11-17

## 2023-11-17 ASSESSMENT — ACTIVITIES OF DAILY LIVING (ADL): ADLS_ACUITY_SCORE: 33

## 2023-11-17 NOTE — LETTER
November 17, 2023      To Whom It May Concern:      Angela Villasenor was seen in our Emergency Department today, 11/17/23.  I expect her condition to improve over the next 3 days.  She may return to work when improved. Please excuse her absences from today and through the weekend if she is scheduled.    Sincerely,        Krys DAVIS RN

## 2023-11-17 NOTE — LETTER
November 17, 2023      To Whom It May Concern:      Angela Villasenor was seen in our Emergency Department today, 11/17/23.  I expect her condition to improve over the next 3 days.  She may return to work/school when improved.    Sincerely,        Krys DAVIS RN

## 2023-11-18 NOTE — ED PROVIDER NOTES
History     Chief Complaint:  Cold Symptoms and Nausea & Vomiting       HPI   Angela Villasenor is a 43 year old female who presents with cold symptoms and nausea and vomiting that has been ongoing for about a month. The patient reports subsequently experiencing fevers, chills, abdominal pain, pharyngitis, and right sided otalgia. The patient does take daily medications for diabetes and hypertriglyceridemia.     Independent Historian:    None    Review of External Notes:  None    Medications:    Roxicodone  Zofran  Toprol  Zestril  Glucophage  Crestor  Nexium  Mobic  Trandate    Past Medical History:    Anemia  Obesity  Hypertension  GERD  Type II diabetes mellitus  Hemorrhoids  Hypertriglyceridemia  Cardiomyopathy    Past Surgical History:    Colonoscopy  Cystoscopy     Physical Exam   Patient Vitals for the past 24 hrs:   BP Temp Temp src Pulse Resp SpO2   11/17/23 1750 (!) 168/94 98.7  F (37.1  C) Temporal 87 18 100 %      Physical Exam  Constitutional: Vital signs reviewed as above  General: Alert,   HEENT: TM is normal bilaterally  Eyes: Pupils are equal, round, and reactive to light.   Neck: Normal range of motion  Cardiovascular: normal rate, Regular rhythm and normal heart sounds.  No MRG  Pulmonary/Chest: Effort normal and breath sounds normal. No respiratory distress. Patient has no wheezes. Patient has no rales.   Musculoskeletal/Extremities: Full ROM.  Endo: No pitting edema  Neurological: Alert, no focal deficits.  Skin: Skin is warm and dry.   Psychiatric: Tired appearing    Emergency Department Course     Laboratory:  Labs Ordered and Resulted from Time of ED Arrival to Time of ED Departure   INFLUENZA A/B, RSV, & SARS-COV2 PCR - Abnormal       Result Value    Influenza A PCR Negative      Influenza B PCR Positive (*)     RSV PCR Negative      SARS CoV2 PCR Negative        Emergency Department Course & Assessments:       Interventions:  Medications - No data to display     Assessments:  1912 I  obtained history and examined the patient as noted above. I deemed the patient safe to discharge home.     Independent Interpretation (X-rays, CTs, rhythm strip):  None    Consultations/Discussion of Management or Tests:  None       Social Determinants of Health affecting care:  None      Disposition:  The patient was discharged to home.     Impression & Plan    CMS Diagnoses: None    Medical Decision Making:  Angela Villasenor is a 43 year old female who presents for evaluation of cough, fever and myalgias.  They have other symptoms including sore throat, nausea, vomiting, right ear pain.   This is consistent with influenza.  The patient is  out of treatment window for influenza and medications ordered as noted above.  They are at risk for pneumonia but no signs of this are detected on today's visit.  Close followup of primary care physician is indicated and return to the ED for high fevers > 103 for more than 48 hours more, increasing productive cough, shortness of breath, or confusion.  There is no signs of serious bacterial infection such as bacteremia, meningitis, UTI/pyelonephritis, strep pharyngitis, etc.      Diagnosis:    ICD-10-CM    1. Influenza B  J10.1          Discharge Medications:  Current Discharge Medication List        START taking these medications    Details   ondansetron (ZOFRAN ODT) 4 MG ODT tab Take 1 tablet (4 mg) by mouth every 8 hours as needed for nausea  Qty: 15 tablet, Refills: 0            Scribe Disclosure:  I, Destin Coronel, am serving as a scribe at 7:18 PM on 11/17/2023 to document services personally performed by Arsalan Chung MD based on my observations and the provider's statements to me.               Arsalan Chung MD  11/17/23 1934

## 2023-12-07 ENCOUNTER — OFFICE VISIT (OUTPATIENT)
Dept: SURGERY | Facility: CLINIC | Age: 43
End: 2023-12-07
Payer: MEDICAID

## 2023-12-07 ENCOUNTER — LAB (OUTPATIENT)
Dept: LAB | Facility: CLINIC | Age: 43
End: 2023-12-07
Payer: MEDICAID

## 2023-12-07 ENCOUNTER — OFFICE VISIT (OUTPATIENT)
Dept: SURGERY | Facility: CLINIC | Age: 43
End: 2023-12-07
Attending: FAMILY MEDICINE
Payer: MEDICAID

## 2023-12-07 VITALS
DIASTOLIC BLOOD PRESSURE: 82 MMHG | SYSTOLIC BLOOD PRESSURE: 142 MMHG | OXYGEN SATURATION: 100 % | HEIGHT: 64 IN | WEIGHT: 228 LBS | BODY MASS INDEX: 38.93 KG/M2 | HEART RATE: 78 BPM

## 2023-12-07 VITALS
HEART RATE: 78 BPM | BODY MASS INDEX: 38.96 KG/M2 | SYSTOLIC BLOOD PRESSURE: 145 MMHG | HEIGHT: 64 IN | OXYGEN SATURATION: 100 % | WEIGHT: 228.2 LBS | DIASTOLIC BLOOD PRESSURE: 83 MMHG

## 2023-12-07 DIAGNOSIS — E66.01 MORBID OBESITY (H): Primary | ICD-10-CM

## 2023-12-07 DIAGNOSIS — I10 ESSENTIAL HYPERTENSION: ICD-10-CM

## 2023-12-07 DIAGNOSIS — E11.9 TYPE 2 DIABETES MELLITUS WITHOUT COMPLICATION, WITHOUT LONG-TERM CURRENT USE OF INSULIN (H): ICD-10-CM

## 2023-12-07 DIAGNOSIS — Z13.228 SCREENING FOR ENDOCRINE, NUTRITIONAL, METABOLIC AND IMMUNITY DISORDER: ICD-10-CM

## 2023-12-07 DIAGNOSIS — E78.2 MIXED HYPERLIPIDEMIA: ICD-10-CM

## 2023-12-07 DIAGNOSIS — K76.0 FATTY LIVER: ICD-10-CM

## 2023-12-07 DIAGNOSIS — E66.01 MORBID OBESITY (H): ICD-10-CM

## 2023-12-07 DIAGNOSIS — I42.9 CARDIOMYOPATHY, UNSPECIFIED TYPE (H): ICD-10-CM

## 2023-12-07 DIAGNOSIS — K21.9 GASTROESOPHAGEAL REFLUX DISEASE, UNSPECIFIED WHETHER ESOPHAGITIS PRESENT: ICD-10-CM

## 2023-12-07 DIAGNOSIS — Z13.0 SCREENING FOR IRON DEFICIENCY ANEMIA: ICD-10-CM

## 2023-12-07 DIAGNOSIS — I50.32 CHRONIC HEART FAILURE WITH PRESERVED EJECTION FRACTION (H): ICD-10-CM

## 2023-12-07 DIAGNOSIS — Z13.29 SCREENING FOR ENDOCRINE, NUTRITIONAL, METABOLIC AND IMMUNITY DISORDER: ICD-10-CM

## 2023-12-07 DIAGNOSIS — Z13.0 SCREENING FOR ENDOCRINE, NUTRITIONAL, METABOLIC AND IMMUNITY DISORDER: ICD-10-CM

## 2023-12-07 DIAGNOSIS — Z13.21 SCREENING FOR ENDOCRINE, NUTRITIONAL, METABOLIC AND IMMUNITY DISORDER: ICD-10-CM

## 2023-12-07 PROBLEM — E78.5 HYPERLIPIDEMIA: Status: ACTIVE | Noted: 2021-11-15

## 2023-12-07 PROBLEM — E66.812 CLASS 2 SEVERE OBESITY DUE TO EXCESS CALORIES WITH SERIOUS COMORBIDITY IN ADULT (H): Status: ACTIVE | Noted: 2023-12-07

## 2023-12-07 LAB
ALBUMIN SERPL BCG-MCNC: 4.1 G/DL (ref 3.5–5.2)
ALP SERPL-CCNC: 108 U/L (ref 40–150)
ALT SERPL W P-5'-P-CCNC: 13 U/L (ref 0–50)
ANION GAP SERPL CALCULATED.3IONS-SCNC: 12 MMOL/L (ref 7–15)
AST SERPL W P-5'-P-CCNC: 20 U/L (ref 0–45)
BILIRUB SERPL-MCNC: 0.3 MG/DL
BUN SERPL-MCNC: 15.7 MG/DL (ref 6–20)
CALCIUM SERPL-MCNC: 9.4 MG/DL (ref 8.6–10)
CHLORIDE SERPL-SCNC: 103 MMOL/L (ref 98–107)
CREAT SERPL-MCNC: 0.88 MG/DL (ref 0.51–0.95)
DEPRECATED HCO3 PLAS-SCNC: 23 MMOL/L (ref 22–29)
EGFRCR SERPLBLD CKD-EPI 2021: 82 ML/MIN/1.73M2
ERYTHROCYTE [DISTWIDTH] IN BLOOD BY AUTOMATED COUNT: 15.2 % (ref 10–15)
GLUCOSE SERPL-MCNC: 105 MG/DL (ref 70–99)
HBA1C MFR BLD: 6.3 %
HCT VFR BLD AUTO: 34.6 % (ref 35–47)
HGB BLD-MCNC: 11 G/DL (ref 11.7–15.7)
MCH RBC QN AUTO: 25.7 PG (ref 26.5–33)
MCHC RBC AUTO-ENTMCNC: 31.8 G/DL (ref 31.5–36.5)
MCV RBC AUTO: 81 FL (ref 78–100)
PLATELET # BLD AUTO: 332 10E3/UL (ref 150–450)
POTASSIUM SERPL-SCNC: 4.6 MMOL/L (ref 3.4–5.3)
PROT SERPL-MCNC: 7.9 G/DL (ref 6.4–8.3)
RBC # BLD AUTO: 4.28 10E6/UL (ref 3.8–5.2)
SODIUM SERPL-SCNC: 138 MMOL/L (ref 135–145)
VIT D+METAB SERPL-MCNC: 34 NG/ML (ref 20–50)
WBC # BLD AUTO: 9.8 10E3/UL (ref 4–11)

## 2023-12-07 PROCEDURE — 83036 HEMOGLOBIN GLYCOSYLATED A1C: CPT

## 2023-12-07 PROCEDURE — 36415 COLL VENOUS BLD VENIPUNCTURE: CPT

## 2023-12-07 PROCEDURE — 82310 ASSAY OF CALCIUM: CPT

## 2023-12-07 PROCEDURE — 99205 OFFICE O/P NEW HI 60 MIN: CPT | Performed by: PHYSICIAN ASSISTANT

## 2023-12-07 PROCEDURE — 97802 MEDICAL NUTRITION INDIV IN: CPT

## 2023-12-07 PROCEDURE — 85027 COMPLETE CBC AUTOMATED: CPT

## 2023-12-07 PROCEDURE — 82306 VITAMIN D 25 HYDROXY: CPT

## 2023-12-07 RX ORDER — METFORMIN HCL 500 MG
2000 TABLET, EXTENDED RELEASE 24 HR ORAL
Qty: 360 TABLET | OUTPATIENT
Start: 2023-12-07

## 2023-12-07 RX ORDER — METFORMIN HCL 500 MG
2000 TABLET, EXTENDED RELEASE 24 HR ORAL
Qty: 120 TABLET | Refills: 3 | Status: SHIPPED | OUTPATIENT
Start: 2023-12-07

## 2023-12-07 NOTE — TELEPHONE ENCOUNTER
Refused metformin refill per clinic protocol.  Was filled today by provider to same pharm.  Nevaeh Tobar, MS, RD, RN

## 2023-12-07 NOTE — PATIENT INSTRUCTIONS
GOALS:  Avoid liquids with meals  Chew foods to applesauce texture  Start complete multivitamin and mineral; 600 mg calcium with vitamin D 2 times per day; 3000 international unit(s) vitamin D

## 2023-12-07 NOTE — ASSESSMENT & PLAN NOTE
12/7/2023 [facility] Initial Wt 228 BMI 39 Wt loss goal: 5#, undecided procedure    We discussed healthy habits to assist with weight loss. We discussed medication that may assist with weight loss. To assist with pre-op wt loss we have increased her metformin to 2000 mg daily.      Avoiding phentermine due to cardiac Hx.  Reviewed GLP-1 but pt concerned with constipation SE.

## 2023-12-07 NOTE — PROGRESS NOTES
New Bariatric Surgery Consultation Note    2023    RE: Angela Villasenor  MR#: [id number]  : 1980    Chief Complaint/Reason for visit: evaluation for possible weight loss surgery    Dear Clinic, Rose Loomis (General),    I had the pleasure of seeing your patient, Angela Villasenor, to evaluate her obesity and consider her for possible weight loss surgery. [place]  via phone used today.     Assessment & Plan   Problem List Items Addressed This Visit       Type 2 diabetes mellitus (H) (Chronic)    Relevant Medications    metFORMIN (GLUCOPHAGE XR) 500 MG 24 hr tablet    Other Relevant Orders    Comprehensive metabolic panel    Hemoglobin A1c    Chronic heart failure with preserved ejection fraction (H)    Relevant Orders    Adult Cardiology Eval  Referral    Essential hypertension    Relevant Orders    Adult Cardiology Eval  Referral    Fatty liver    Gastroesophageal reflux disease    Hyperlipidemia    Relevant Orders    Adult Cardiology Eval  Referral    Morbid obesity (H) - Primary     2023 SWL Initial Wt 228 BMI 39 Wt loss goal: 5#, undecided procedure    We discussed healthy habits to assist with weight loss. We discussed medication that may assist with weight loss. To assist with pre-op wt loss we have increased her metformin to 2000 mg daily.      Avoiding phentermine due to cardiac Hx.  Reviewed GLP-1 but pt concerned with constipation SE.             Relevant Medications    metFORMIN (GLUCOPHAGE XR) 500 MG 24 hr tablet    Other Relevant Orders    CBC with platelets    Comprehensive metabolic panel    Hemoglobin A1c    Vitamin D Deficiency    NUTRITION REFERRAL    Adult Cardiology Eval  Referral     Other Visit Diagnoses       Screening for iron deficiency anemia        Relevant Orders    CBC with platelets    Cardiomyopathy, unspecified type (H)        Relevant Orders    Adult Cardiology Eval  Referral               In summary, Angela ALEXIS  Hamilton has Class III obesity with a body mass index of Body mass index is 39.14 kg/m . kg/m2 and the comorbidities stated above. She completed an informational seminar and is a possible candidate for bariatric surgery.   She will complete the tasklist below. Once completepetra will see the surgeon for consultation for bariatric surgery. Pt verbalizes understanding of the process to surgery and the post operative schedule.  All questions were answered.     Bariatric Task List    Lose 5 lbs prior to surgery.  Goal Weight: 223 lbs    Have preoperative laboratory tests drawn.     Psychological Evaluation with MMPI and clearance for weight loss surgery.    Achieve clearance from dietitian to see surgeon.    In Person Bariatric Education Visit    See PCP or Ob/Gyn to come up with reliable birth control plan (2 forms for most birth control options for first 18 months following surgery.)     Cardiology Clearance    Pt to follow up in 12 wks for a face to face visit with me. We will discuss the available weight loss surgeries including risks and benefits, med check for increase of metformin, review tasklist, and do a physical exam.     74 minutes spent on the date of the encounter doing chart review, history and exam, review test results, counseling, developing plan of care, documentation, and further activities as noted above.     HISTORY OF PRESENT ILLNESS:  Weight Loss History Reviewed with Patient    How long have you been overweight? Childhood and worsened in pregnancy,.     Reason for wt gain Pregnancy   What is the most that you have ever weighed? 255 lb   What is the most weight you have lost? 30 lbs   I have tried the following methods to lose weight Metformin   I have tried the following weight loss medications? (Check all that apply) Metformin   Have you worked with any weight loss programs previously for weight loss or weight loss surgery? No   Have you ever had weight loss surgery? No       CO-MORBIDITIES OF OBESITY  INCLUDE:        I have the following health issues associated with obesity: See below       PAST MEDICAL HISTORY:  Past Medical History:   Diagnosis Date    Cardiomyopathy (H)     Chronic heart failure with preserved ejection fraction (H) 12/14/2021    DM2 (diabetes mellitus, type 2) (H)     External hemorrhoids     Fatty liver 11/12/2021    Gastroesophageal reflux disease     History of H. pylori infection     Hypertension     Hypertriglyceridemia     Obesity      6/14/2023 9:26 AM  Spoke with Pt regarding making a follow up cardiologist appt with Dr. Mendoza with Labs and Echo.( No follow up occurred.)  Last Echo 2022.      PAST SURGICAL HISTORY: NO previous Surgery  Past Surgical History:   Procedure Laterality Date    COLONOSCOPY      CYSTOSCOPY, INJECT BOTOX N/A 1/20/2022    Procedure: EXAM UNDER ANESTHESIA WITH BOTOX INJECTION;  Surgeon: Misti Juan MD;  Location: [facility]     Family Hx:   Medical History Relation Name Comments   No Known Problems Father       Hypertension Mother       Stroke Mother       Cancer-breast No Family History         SOCIAL HISTORY:       Are you employed, what is your occupation [employer]   Which best describes your marital status:    Do you have children? Lives with  4 children 26, 18, 10, 6   Who can you count on for support throughout your weight loss surgery journey and to help you for 1st week following surgery Family   Can you afford 3 meals a day?  Yes   Can you afford 40 dollars a month for vitamins? Yes- insurance will cover     HABITS:        How often do you drink alcohol? none   If you do drink alcohol, how many drinks might you have in a day? (one drink = 5 oz. wine, 1 can/bottle of beer, 1 shot liquor) -   Have you ever used any of the following nicotine products? None   Have you or are you currently using street drugs or prescription strength medication for which you do not have a prescription for? -   Do you have a history of chemical dependency  (alcohol or drug abuse)?      PSYCHOLOGICAL HISTORY:       Have you ever attempted suicide? No   Have you had thoughts of suicide in the past year? No   Have you ever been hospitalized for mental illness or a suicide attempt? No   Are you currently seeing a therapist or counselor?  No   Are you currently seeing a psychiatrist? No     ROS        Skin:  Itchy under pannus- gets rashes   HEENT: Denies Asthma, allergies, thyroid problems   Musculoskeletal: Denies Joint Pain, Back pain, LE edema   Cardiovascular: Positive for left sided Chest Pain/numbness under breast- occurs once a week, Denies Palpitations   Pulmonary: Denies Shortness of breath with activity, Snoring, Dyspnea STOP-BANG 3.  ESS:12 5 yrs ago had sleep study- normal.    Gastrointestinal: Denies Nausea, Vomiting, Diarrhea, Positive for Constipation- has BM every day but takes Miralax 3 doses regularly.    Genitourinary: Positive  stress incontinence, Denies kidney stones    Hematological: Denies hx of clotting disorders, DVT, anemia   Neurological: Denies headaches, seizures   Female only: Denies Polycystic ovarian syndrome (PCOS),  Monthly menses.  Uses no protection for Birth Control        EATING BEHAVIORS:        Have you or anyone else thought that you had an eating disorder? No   Do you currently binge eat? No   Are you an emotional eater? No   Do you get up to eat after falling asleep? No     EXERCISE:        How often do you exercise? None   What long (in minutes)? -   What types of exercise do you do? -   What keeps you from being more active?  Back pain     MEDICATIONS:  Current Outpatient Medications   Medication    esomeprazole (NEXIUM) 20 MG DR capsule    hydrocortisone, Perianal, (HYDROCORTISONE) 2.5 % cream    lactulose encephalopathy (CHRONULAC) 10 GM/15ML SOLUTION    lisinopril (ZESTRIL) 10 MG tablet    metFORMIN (GLUCOPHAGE XR) 500 MG 24 hr tablet    metFORMIN (GLUCOPHAGE) 500 MG tablet    metoprolol succinate ER (TOPROL-XL) 50 MG 24 hr  "tablet    ondansetron (ZOFRAN ODT) 4 MG ODT tab    polyethylene glycol (MIRALAX) 17 g packet    rosuvastatin (CRESTOR) 10 MG tablet     No current facility-administered medications for this visit.        LABS AND RECORDS REVIEWED:  Labs reviewed in [facility]    PHYSICAL EXAM:  BP (!) 142/80   Pulse 78   Ht 5' 4\" (1.626 m)   Wt 228 lb (103.4 kg)   SpO2 100%   BMI 39.14 kg/m    GENERAL:  Good development and normal affect in no acute distress.   GASTROINTESTINAL: Abdomen is obese, nondistended, soft, nontender.  LOWER EXTREMITIES: No LE edema bilaterally, no cyanosis, ulceration, or chronic venous stasis noted.  MUSCULOSKELETAL:  Moves all 4 extremities equal and strong. Has a normal gait.   NEUROLOGIC: Cranial nerves II-XII grossly intact.  SKIN: Large redundant, hanging abdominal wall pannus hanging over the suprapubic region       "

## 2023-12-07 NOTE — LETTER
December 7, 2023      Angela Villasenor  [address] [address]              Bariatric Task List      Lose 5 lbs prior to surgery.  Goal Weight: *** lbs    Have preoperative laboratory tests drawn.     Psychological Evaluation with MMPI and clearance for weight loss surgery.    Achieve clearance from dietitian to see surgeon.    In Person Bariatric Education Visit

## 2023-12-07 NOTE — LETTER
December 7, 2023      Angela Villasenor  [address] [facility] [address]      Bariatric Task List        Required Weight loss:    Lose 5 lbs prior to surgery.  Goal Weight: 223 lbs    Tasks:    Have preoperative laboratory tests drawn.     Psychological Evaluation with MMPI and clearance for weight loss surgery.    Achieve clearance from dietitian to see surgeon.    In Person Bariatric Education Visit     See PCP or Ob/Gyn to come up with reliable birth control plan (2 forms for most birth control options for first 18 months following surgery.)     Cardiology Clearance

## 2023-12-07 NOTE — PROGRESS NOTES
Patient's measurements today were:    Neck = 14 inches  Mary Chaudhary MA on 12/7/2023 at 11:05 AM

## 2023-12-07 NOTE — PATIENT INSTRUCTIONS
Nice to meet you today.  Below is our plan we discussed.-  AMPARO Garza  Bariatric Task List  Lose 5 lbs prior to surgery.  Goal Weight: 23 lbs  Have preoperative laboratory tests drawn. -  Call [phone] for an appt.    Psychological Evaluation with MMPI and clearance for weight loss surgery.- Call [phone] to schedule.   Achieve clearance from dietitian to see surgeon.  In Person Bariatric Education Visit  See PCP or Ob/Gyn to come up with reliable birth control plan (2 forms for most birth control options for first 18 months following surgery.)   Cardiac Clearance  FOLLOW-UP:  3 mo PRE-SURGERY visit IN CLINIC with Kayla Lakhani PA-C- Call [phone] to schedule   _______________________________________  Road to Surgery:   1. Complete the above tasklist  2. Following tasklist completion, see the surgeon, go over your surgery, and sign consents  3. File will be sent for insurance approval  4.Once approved, our  will call to set up your surgery  (It usually takes 4-6 months to get to surgery from your initial consult)  After Surgery:   Follow up is important to keep your weight off and your vitamin levels up.  Lab will be monitored every 3 months for the first year and yearly thereafter.  Vitamin supplementation is a lifelong need. You will take:  2 Multivitamins containing 18mg of iron  B-12 daily or by injection monthly  Vitamin D3 5000 IU daily   Calcium citrate 2 daily  Thiamine 100 mg weekly   Vitron C once daily if you are a menstruating female  Prevent ulcers by avoiding tobacco and anti-inflammatories (NSAIDS) forever.  NSAIDS examples: Aspirin, Ibuprofen, Naproxen. Tylenol is fine.  Alcohol affects you differently. There is a 10% increase of Alcohol Use Disorder in patients with bariatric surgery.   If you have even ONE drink DO NOT DRIVE.

## 2023-12-07 NOTE — PROGRESS NOTES
"New Bariatric Nutrition Consultation Note    Reason For Visit: Nutrition Assessment    Angela Villasenor is a 43 year old presenting today for new bariatric nutrition consult.  Pt is interested in laparoscopic sleeve gastrectomy.  Patient is accompanied by  on the phone.    ANTHROPOMETRICS:  Estimated body mass index is 39.17 kg/m  as calculated from the following:    Height as of an earlier encounter on 12/7/23: 1.626 m (5' 4\").    Weight as of an earlier encounter on 12/7/23: 103.5 kg (228 lb 3.2 oz).    Required weight loss goal pre-op: 5 lbs from initial consult weight (goal weight 223 lbs or less before surgery)    SUPPLEMENT INFORMATION:  None     NUTRITION HISTORY:  Wakes up at 6 AM   Breakfast:sometimes; may eat 3-4 times per week; bread or injero tea and coffee (sugar) 9:30 or 10:00 AM  Skips every day   dinner 4:00-5:00 meat or chicken with pasta or rice   Snacks: smoothie with baby spinach and jose seeds or apple and lemon   Beverages: water 4-5 bottles water per day; 12-16 oz apple juice 1 time per week; Sprite when dines out; milk 2% 1 time per week   Dine out: 1 time per month (buffet, [employer], [employer], [employer] foods)     ADDITIONAL INFORMATION:  Patient worked with the [facility] in preparation for weight loss surgery for the past year.  Patient states it did not work out.  Patient has 4 kids (26, 18, 10, 6).      NUTRITION DIAGNOSIS:  Obesity r/t long history of self-monitoring deficit and excessive energy intake aeb BMI >30 kg/m2.    INTERVENTION:  Intervention Provided/Education Provided on post-op diet guidelines, vitamins/minerals essential post-operatively, GI anatomy of bariatric surgeries, ways to help prepare for post-op diet guidelines pre-operatively and portion/calorie-control.  Provided pt with list of goals RD contact information.      Patient Understanding: fair-good  Expected patient engagement: fair-good    GOALS:  Avoid liquids with meals  Chew foods to " applesauce texture  Start complete multivitamin and mineral; 600 mg calcium with vitamin D 2 times per day; 3000 international unit(s) vitamin D     Follow-Up:   Recommend 3-4 follow up visits to assist with lifestyle changes or per insurance.    Time spent with patient: 41 minutes    Abbey Zepeda, RD, LD  [facility] Outpatient Dietitian/Weight Loss Clinic   [phone] (office phone)

## 2023-12-07 NOTE — LETTER
Bariatric Letter of Clearance from Cardiology  Dear Cardiologist:    Thank you for taking the time to see Arik for a preoperative cardiac visit while preparing for bariatric surgery.  It is important to us that our surgical patients who have a history of cardiac disease get clearance from their cardiologist.     Clearance letter to include the following:    Is the patient under your care?  If so, for how long?  Comment on the relevant diagnosis.   Is he on daily medication for this diagnosis?  Arrange and expedite necessary testing.  Indicate what treatment is needed pre-surgery (if any), during inpatient hospitalization, and post surgery.   Provider a letter stating that the patient is (or is not) cleared and stable to proceed with bariatric surgery from a cardiac standpoint.      Sincerely,      Kayla Lakhani PA-C   [facility]     Please fax letter to [phone] or route in Epic to Kayla Lakhani PA-C

## 2023-12-07 NOTE — PATIENT INSTRUCTIONS
Nice to meet you today.  Below is our plan we discussed.-  AMPARO Garza  Bariatric Task List  Lose 5 lbs prior to surgery.  Goal Weight: 223 lbs  Have preoperative laboratory tests drawn. -  Call [phone] for an appt.    Psychological Evaluation with MMPI and clearance for weight loss surgery.- Call [phone] to schedule.   Achieve clearance from dietitian to see surgeon.  In Person Bariatric Education Visit    FOLLOW-UP:  3 mo PRE-SURGERY visit IN CLINIC with Kayla Lakhani PA-C- Call [phone] to schedule   _______________________________________  Road to Surgery:   1. Complete the above tasklist  2. Following tasklist completion, see the surgeon, go over your surgery, and sign consents  3. File will be sent for insurance approval  4.Once approved, our  will call to set up your surgery  (It usually takes 4-6 months to get to surgery from your initial consult)  After Surgery:   Follow up is important to keep your weight off and your vitamin levels up.  Lab will be monitored every 3 months for the first year and yearly thereafter.  Vitamin supplementation is a lifelong need. You will take:  2 Multivitamins containing 18mg of iron  B-12 daily or by injection monthly  Vitamin D3 5000 IU daily   Calcium citrate 2 daily  Thiamine 100 mg weekly   Vitron C once daily if you are a menstruating female  Prevent ulcers by avoiding tobacco and anti-inflammatories (NSAIDS) forever.  NSAIDS examples: Aspirin, Ibuprofen, Naproxen. Tylenol is fine.  Alcohol affects you differently. There is a 10% increase of Alcohol Use Disorder in patients with bariatric surgery.   If you have even ONE drink DO NOT DRIVE.    _____________________  Road to Surgery:   1. Complete the above tasklist  2. Following tasklist completion, see the surgeon, go over your surgery, and sign consents  3. File will be sent for insurance approval  4.Once approved, our  will call to set up your surgery  (It usually  takes 4-6 months to get to surgery from your initial consult)  After Surgery:   Follow up is important to keep your weight off and your vitamin levels up.  Lab will be monitored every 3 months for the first year and yearly thereafter.  Vitamin supplementation is a lifelong need. You will take:  2 Multivitamins containing 18mg of iron  B-12 daily or by injection monthly  Vitamin D3 5000 IU daily   Calcium citrate 2 daily  Thiamine 100 mg weekly   Vitron C once daily if you are a menstruating female  Prevent ulcers by avoiding tobacco and anti-inflammatories (NSAIDS) forever.  NSAIDS examples: Aspirin, Ibuprofen, Naproxen. Tylenol is fine.  Alcohol affects you differently. There is a 10% increase of Alcohol Use Disorder in patients with bariatric surgery.   If you have even ONE drink DO NOT DRIVE.

## 2023-12-11 ENCOUNTER — TELEPHONE (OUTPATIENT)
Dept: SURGERY | Facility: CLINIC | Age: 43
End: 2023-12-11
Payer: MEDICAID

## 2023-12-11 NOTE — TELEPHONE ENCOUNTER
Called pt via [employer] .  Let patient know she will need to follow-up with primary care regarding anemia.  Please add this to task list.  Otherwise remaining labs are within normal limits.  Diabetes is still under control.   This was communicated to pt via .  No questions per pt.   Nevaeh Tobar, MS, RD, RN

## 2023-12-23 ENCOUNTER — HEALTH MAINTENANCE LETTER (OUTPATIENT)
Age: 43
End: 2023-12-23

## 2024-01-17 ENCOUNTER — OFFICE VISIT (OUTPATIENT)
Dept: CARDIOLOGY | Facility: CLINIC | Age: 44
End: 2024-01-17
Attending: PHYSICIAN ASSISTANT
Payer: COMMERCIAL

## 2024-01-17 VITALS
SYSTOLIC BLOOD PRESSURE: 132 MMHG | HEART RATE: 67 BPM | BODY MASS INDEX: 38.51 KG/M2 | WEIGHT: 225.6 LBS | OXYGEN SATURATION: 100 % | DIASTOLIC BLOOD PRESSURE: 72 MMHG | HEIGHT: 64 IN

## 2024-01-17 DIAGNOSIS — I77.3 FIBROMUSCULAR DYSPLASIA (H): ICD-10-CM

## 2024-01-17 DIAGNOSIS — E78.2 MIXED HYPERLIPIDEMIA: ICD-10-CM

## 2024-01-17 DIAGNOSIS — I10 ESSENTIAL HYPERTENSION: ICD-10-CM

## 2024-01-17 DIAGNOSIS — I25.42 SPONTANEOUS DISSECTION OF CORONARY ARTERY: Primary | ICD-10-CM

## 2024-01-17 DIAGNOSIS — E66.01 MORBID OBESITY (H): ICD-10-CM

## 2024-01-17 DIAGNOSIS — I50.32 CHRONIC HEART FAILURE WITH PRESERVED EJECTION FRACTION (H): ICD-10-CM

## 2024-01-17 DIAGNOSIS — I42.9 CARDIOMYOPATHY, UNSPECIFIED TYPE (H): ICD-10-CM

## 2024-01-17 PROCEDURE — 99205 OFFICE O/P NEW HI 60 MIN: CPT | Performed by: INTERNAL MEDICINE

## 2024-01-17 PROCEDURE — 93000 ELECTROCARDIOGRAM COMPLETE: CPT | Performed by: INTERNAL MEDICINE

## 2024-01-17 RX ORDER — ASPIRIN 81 MG/1
81 TABLET ORAL DAILY
Qty: 90 TABLET | Refills: 3 | Status: SHIPPED | OUTPATIENT
Start: 2024-01-17

## 2024-01-17 NOTE — PATIENT INSTRUCTIONS
January 17, 2024    Thank you for allowing our Cardiology team to participate in your care.     Please note the following changes to your heart treatment plan:     Medication changes:   - none    Tests to be done:  - TTE (heart ultrasound)    Follow up:  - Follow up in about 6 months, or sooner as needed.      For scheduling, please call [phone].      Please contact our team through [employer] or our Nurse Team Voicemail service [phone], or the General Clinic [phone] for any questions or concerns.     If you are having a medical emergency, please call 179.     Sincerely,    Christiano Edwards MD, FACC  Cardiology    [facility] - [place]

## 2024-01-17 NOTE — PROGRESS NOTES
Cardiology Clinic Consultation:    January 17, 2024   Patient Name: Angela Villasenor  Patient MRN: [id number]    Consult indication: SCAD    HPI:    As you know patient is a 44-year-old female the past medical history significant for hypertension, hyperlipidemia, type 2 diabetes, recovered cardiomyopathy, presumed SCAD, who presents to [facility] care.      Encounter done with a licensed [facility] .      Patient had been followed closely by [facility] cardiology.  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  TTE 9/2021 LVEF 40%, akinesis of the mid apical anteroseptal, anterior, inferoseptal wall segments as well as the apex.  Coronary CTA 12/6/2021 report was addended 2/28/2022, initial report noted no significant abnormalities, on review, there were findings in the mid LAD suggestive of spontaneous coronary artery dissection.  Cardiac MRI 2/24/2022 demonstrated findings consistent with small subendocardial infarction involving the mid LAD, LVEF 57%, RVEF 60%, no significant valvular abnormalities.    Patient is planning to undergo bariatric surgery.  Overall she reports that she has been doing well from a cardiac perspective.  She denies any exertional chest pain, chest pressure, abnormal shortness of breath.  She is able to climb a flight of stairs, and work around her home engaging activities such as vacuuming, heavy cleaning, without abnormal symptoms.  She does note occasional chest wall discomfort, described as a numbness over her left breast, this has been present for several years, nonexertional in nature, relieved with massage over the area.    She works a physically demanding job with [employer].  Blood pressure today in clinic 132/72 mmHg.    TTE 10/28/2021:  1. Normal LV size, mild LV wall thickness, estimated EF 40%.  2. Akinesis involving the mid to apical anteroseptum, anterior and inferoseptum wall segments as well as the apex. No left ventricular  thrombus was seen.   3. The RV is not well-visualized, function appears to be normal.  4. No hemodynamically significant valvular abnormalities.  5. No pericardial effusion.     TTE 9/22/2021:  1. Normal LV size, mild LV wall thickness, estimated EF 40%.  2. Akinesis involving the mid to apical anteroseptum, anterior and inferoseptum wall segments as well as the apex. No left ventricular thrombus was seen.   3. The RV is not well-visualized, function appears to be normal.  4. No hemodynamically significant valvular abnormalities.  5. No pericardial effusion.     CARDIAC MRI 2/24/2022:  1. Findings are consistent with small subendocardial infarction involving the mid LAD which in retrospect was demonstrated on coronary CTA from 12/2021. Findings on CCTA suggest to me spontaneous coronary artery dissection involving the mid LAD.  - There is discrete myocardial edema involving the apical septum and apical anterior segments with wall  motion abnormality involving the mid anteroseptum and apical septal segment which are hypokinetic.  - Discrete hypoperfusion involving the mid anteroseptum in early-Jean-Pierre enhancement supports LAD involvement.  2. Normal LV size and preserved LVEF=57% but regional wall motion abnormalities. Normal wall thickness.   - No evidence of infiltrative process.  3. Normal RV size and systolic function. RVEF=60%.  4. No significant valvular disease.    CT CORONARY ANGIOGRAM 12/6/2021:  1. No CTA evidence for CAD.  - Ca score zero.  - no plaque or stenosis seen.  - no coronary lesion to account for symptoms.  2. Normal caliber ascending aorta and root.  3. Normal appearing pericardium.  4. Please see separate radiology report for review of non-cardiovascular structures.     CT PULMONARY ANGIOGRAM 8/25/2021:  1. No pulmonary emboli.  2. No abnormality.     Assessment and Plan/Recommendations:    # Recovered cardiomyopathy. Euvolemic.   # Suspected mid LAD SCAD, [facility] coronary CTA 12/6/2021, CMR  2/24/2022 with subendocardial infarct in the corresponding territory  # HTN, stable  # HL, on statin    -Overall patient is in stable cardiac health without symptoms concerning for angina or decompensated heart failure.  She is able to engage in activities equivalent to at least 4 METS without abnormal symptoms.  -  Reviewed prior cardiac diagnostic testing and cardiac diagnoses in detail with patient, and her son (over phone.)  Unfortunately they were not familiar with this diagnosis of SCAD.  Discussed pathophysiology, implications for future management.  Had not been seen in 2 years in cardiology clinic with [facility].  Will reassess with a TTE.  Recommend starting aspirin 81 mg daily.  If no significant abnormalities on TTE, patient may proceed with surgery without further cardiac diagnostic testing, recommend close monitoring of hemodynamics, judicious fluid management given history of recovered cardiomyopathy.  Advised on activity restrictions, patient does work a relatively physically demanding job, provided a work note communicating our recommendation that she not lift more than 20 pounds regularly.  Will screen for associated FMD with CTA head to thighs.  Continue remainder of cardiac regimen including lisinopril, metoprolol succinate, rosuvastatin.  Follow-up in 6 months, or sooner as needed.      Thank you for allowing our team to participate in the care of Angela Villasenor.  Please do not hesitate to call or page me with any questions or concerns.    Sincerely,     Christiano Edwards MD, [facility]  Cardiology  January 17, 2024      Kayla Lakhani PA-C  [address]  [place] [id number]      Total time spent on this encounter today: Greater than 60 minutes, providing care in this encounter including, but not limited to, reviewing prior medical records, laboratory data, imaging studies, diagnostic studies, procedure notes, formulating an assessment and plan, recommendations, discussion  and counseling with patient face to face, dictation.    Past Medical History:     The ASCVD Risk score (Josh DK, et al., 2019) failed to calculate for the following reasons:    Cannot find a previous HDL lab    Cannot find a previous total cholesterol lab  Patient Active Problem List   Diagnosis    Chronic heart failure with preserved ejection fraction (H)    Essential hypertension    Fatty liver    Gastroesophageal reflux disease    Hyperlipidemia    Type 2 diabetes mellitus (H)    Morbid obesity (H)       Past Surgical History:   Past Surgical History:   Procedure Laterality Date    COLONOSCOPY      CYSTOSCOPY, INJECT BOTOX N/A 1/20/2022    Procedure: EXAM UNDER ANESTHESIA WITH BOTOX INJECTION;  Surgeon: Misti Juan MD;  Location: [facility]       Medications (outpatient):  Current Outpatient Medications   Medication Sig Dispense Refill    esomeprazole (NEXIUM) 20 MG DR capsule       hydrocortisone, Perianal, (HYDROCORTISONE) 2.5 % cream Place rectally 2 times daily as needed for hemorrhoids 30 g 0    lactulose encephalopathy (CHRONULAC) 10 GM/15ML SOLUTION Take by mouth 3 times daily      lisinopril (ZESTRIL) 10 MG tablet Take 10 mg by mouth      metFORMIN (GLUCOPHAGE XR) 500 MG 24 hr tablet Take 4 tablets (2,000 mg) by mouth daily (with dinner) 120 tablet 3    metoprolol succinate ER (TOPROL-XL) 50 MG 24 hr tablet       ondansetron (ZOFRAN ODT) 4 MG ODT tab Take 1 tablet (4 mg) by mouth every 8 hours as needed for nausea 15 tablet 0    polyethylene glycol (MIRALAX) 17 g packet Take 1 packet by mouth 3 times daily      rosuvastatin (CRESTOR) 10 MG tablet       metFORMIN (GLUCOPHAGE) 500 MG tablet Take 500 mg by mouth (Patient not taking: Reported on 1/17/2024)         Allergies:  No Known Allergies    Social History:   History   Drug Use Unknown      History   Smoking Status    Never   Smokeless Tobacco    Never     Social History    Substance and Sexual Activity      Alcohol use: Never       Family  "History:  History reviewed. No pertinent family history.    Review of Systems:   A comprehensive 12 system review of systems was carried out.  Pertinent positives and negatives are noted above. Otherwise negative for contributory information.    Objective & Physical Exam:  /72   Pulse 67   Ht 1.626 m (5' 4\")   Wt 102.3 kg (225 lb 9.6 oz)   SpO2 100%   BMI 38.72 kg/m    Wt Readings from Last 2 Encounters:   01/17/24 102.3 kg (225 lb 9.6 oz)   12/07/23 103.4 kg (228 lb)     Body mass index is 38.72 kg/m .   Body surface area is 2.15 meters squared.    Constitutional: appears stated age, in no apparent distress, appears to be well nourished  Head: normocephalic, atraumatic  Neck: supple, trachea midline  Pulmonary: clear to auscultation bilaterally, no wheezes, no rales, no increased work of breathing  Cardiovascular: JVP normal, regular rate, regular rhythm, normal S1 and S2, no S3, S4, no murmur appreciated, no lower extremity edema  Gastrointestinal: no guarding, non-rigid   Neurologic: awake, alert, moves all extremities  Skin: no jaundice, warm on limited exam  Psychiatric: affect is normal, answers questions appropriately, oriented to self and place    Data reviewed:  Lab Results   Component Value Date    WBC 9.8 12/07/2023    WBC 8.9 01/28/2021    RBC 4.28 12/07/2023    RBC 3.99 01/28/2021    HGB 11.0 (L) 12/07/2023    HGB 11.1 (L) 01/28/2021    HCT 34.6 (L) 12/07/2023    HCT 35.0 01/28/2021    MCV 81 12/07/2023    MCV 88 01/28/2021    MCH 25.7 (L) 12/07/2023    MCH 27.8 01/28/2021    MCHC 31.8 12/07/2023    MCHC 31.7 01/28/2021    RDW 15.2 (H) 12/07/2023    RDW 13.8 01/28/2021     12/07/2023     01/28/2021     Sodium   Date Value Ref Range Status   12/07/2023 138 135 - 145 mmol/L Final     Comment:     Reference intervals for this test were updated on 09/26/2023 to more accurately reflect our healthy population. There may be differences in the flagging of prior results with similar " values performed with this method. Interpretation of those prior results can be made in the context of the updated reference intervals.    01/28/2021 137 133 - 144 mmol/L Final     Potassium   Date Value Ref Range Status   12/07/2023 4.6 3.4 - 5.3 mmol/L Final   08/25/2021 3.7 3.4 - 5.3 mmol/L Final   01/28/2021 4.1 3.4 - 5.3 mmol/L Final     Chloride   Date Value Ref Range Status   12/07/2023 103 98 - 107 mmol/L Final   08/25/2021 107 94 - 109 mmol/L Final   01/28/2021 107 94 - 109 mmol/L Final     Carbon Dioxide   Date Value Ref Range Status   01/28/2021 28 20 - 32 mmol/L Final     Carbon Dioxide (CO2)   Date Value Ref Range Status   12/07/2023 23 22 - 29 mmol/L Final   08/25/2021 25 20 - 32 mmol/L Final     Anion Gap   Date Value Ref Range Status   12/07/2023 12 7 - 15 mmol/L Final   08/25/2021 6 3 - 14 mmol/L Final   01/28/2021 2 (L) 3 - 14 mmol/L Final     Glucose   Date Value Ref Range Status   12/07/2023 105 (H) 70 - 99 mg/dL Final   08/25/2021 156 (H) 70 - 99 mg/dL Final   01/28/2021 135 (H) 70 - 99 mg/dL Final     Urea Nitrogen   Date Value Ref Range Status   12/07/2023 15.7 6.0 - 20.0 mg/dL Final   08/25/2021 9 7 - 30 mg/dL Final   01/28/2021 17 7 - 30 mg/dL Final     Creatinine   Date Value Ref Range Status   12/07/2023 0.88 0.51 - 0.95 mg/dL Final   01/28/2021 0.80 0.52 - 1.04 mg/dL Final     GFR Estimate   Date Value Ref Range Status   12/07/2023 82 >60 mL/min/1.73m2 Final   01/28/2021 90 >60 mL/min/[1.73_m2] Final     Comment:     Non  GFR Calc  Starting 12/18/2018, serum creatinine based estimated GFR (eGFR) will be   calculated using the Chronic Kidney Disease Epidemiology Collaboration   (CKD-EPI) equation.       Calcium   Date Value Ref Range Status   12/07/2023 9.4 8.6 - 10.0 mg/dL Final   01/28/2021 8.8 8.5 - 10.1 mg/dL Final     Bilirubin Total   Date Value Ref Range Status   12/07/2023 0.3 <=1.2 mg/dL Final   01/05/2021 0.6 0.2 - 1.3 mg/dL Final     Alkaline Phosphatase   Date  Value Ref Range Status   12/07/2023 108 40 - 150 U/L Final     Comment:     Reference intervals for this test were updated on 11/14/2023 to more accurately reflect our healthy population. There may be differences in the flagging of prior results with similar values performed with this method. Interpretation of those prior results can be made in the context of the updated reference intervals.   01/05/2021 106 40 - 150 U/L Final     ALT   Date Value Ref Range Status   12/07/2023 13 0 - 50 U/L Final     Comment:     Reference intervals for this test were updated on 6/12/2023 to more accurately reflect our healthy population. There may be differences in the flagging of prior results with similar values performed with this method. Interpretation of those prior results can be made in the context of the updated reference intervals.     01/05/2021 37 0 - 50 U/L Final     AST   Date Value Ref Range Status   12/07/2023 20 0 - 45 U/L Final     Comment:     Reference intervals for this test were updated on 6/12/2023 to more accurately reflect our healthy population. There may be differences in the flagging of prior results with similar values performed with this method. Interpretation of those prior results can be made in the context of the updated reference intervals.   01/05/2021 25 0 - 45 U/L Final     Lab Results   Component Value Date    A1C 6.3 12/07/2023

## 2024-01-17 NOTE — LETTER
1/17/2024    [facility]  [address] [address]    RE: Angela Villasenor       Dear Colleague,     I had the pleasure of seeing Angela Villasenor in the [facility].  Cardiology Clinic Consultation:    January 17, 2024   Patient Name: Angela Villasenor  Patient MRN: [id number]    Consult indication: SCAD    HPI:    As you know patient is a 44-year-old female the past medical history significant for hypertension, hyperlipidemia, type 2 diabetes, recovered cardiomyopathy, presumed SCAD, who presents to [facility] care.      Encounter done with a licensed [facility] .      Patient had been followed closely by [facility].  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  TTE 9/2021 LVEF 40%, akinesis of the mid apical anteroseptal, anterior, inferoseptal wall segments as well as the apex.  Coronary CTA 12/6/2021 report was addended 2/28/2022, initial report noted no significant abnormalities, on review, there were findings in the mid LAD suggestive of spontaneous coronary artery dissection.  Cardiac MRI 2/24/2022 demonstrated findings consistent with small subendocardial infarction involving the mid LAD, LVEF 57%, RVEF 60%, no significant valvular abnormalities.    Patient is planning to undergo bariatric surgery.  Overall she reports that she has been doing well from a cardiac perspective.  She denies any exertional chest pain, chest pressure, abnormal shortness of breath.  She is able to climb a flight of stairs, and work around her home engaging activities such as vacuuming, heavy cleaning, without abnormal symptoms.  She does note occasional chest wall discomfort, described as a numbness over her left breast, this has been present for several years, nonexertional in nature, relieved with massage over the area.    She works a physically demanding job with [employer].  Blood pressure today in clinic 132/72 mmHg.    TTE  10/28/2021:  1. Normal LV size, mild LV wall thickness, estimated EF 40%.  2. Akinesis involving the mid to apical anteroseptum, anterior and inferoseptum wall segments as well as the apex. No left ventricular thrombus was seen.   3. The RV is not well-visualized, function appears to be normal.  4. No hemodynamically significant valvular abnormalities.  5. No pericardial effusion.     TTE 9/22/2021:  1. Normal LV size, mild LV wall thickness, estimated EF 40%.  2. Akinesis involving the mid to apical anteroseptum, anterior and inferoseptum wall segments as well as the apex. No left ventricular thrombus was seen.   3. The RV is not well-visualized, function appears to be normal.  4. No hemodynamically significant valvular abnormalities.  5. No pericardial effusion.     CARDIAC MRI 2/24/2022:  1. Findings are consistent with small subendocardial infarction involving the mid LAD which in retrospect was demonstrated on coronary CTA from 12/2021. Findings on CCTA suggest to me spontaneous coronary artery dissection involving the mid LAD.  - There is discrete myocardial edema involving the apical septum and apical anterior segments with wall  motion abnormality involving the mid anteroseptum and apical septal segment which are hypokinetic.  - Discrete hypoperfusion involving the mid anteroseptum in early-Jean-Pierre enhancement supports LAD involvement.  2. Normal LV size and preserved LVEF=57% but regional wall motion abnormalities. Normal wall thickness.   - No evidence of infiltrative process.  3. Normal RV size and systolic function. RVEF=60%.  4. No significant valvular disease.    CT CORONARY ANGIOGRAM 12/6/2021:  1. No CTA evidence for CAD.  - Ca score zero.  - no plaque or stenosis seen.  - no coronary lesion to account for symptoms.  2. Normal caliber ascending aorta and root.  3. Normal appearing pericardium.  4. Please see separate radiology report for review of non-cardiovascular structures.     CT PULMONARY ANGIOGRAM  8/25/2021:  1. No pulmonary emboli.  2. No abnormality.     Assessment and Plan/Recommendations:    # Recovered cardiomyopathy. Euvolemic.   # Suspected mid LAD SCAD, [facility] coronary CTA 12/6/2021, CMR 2/24/2022 with subendocardial infarct in the corresponding territory  # HTN, stable  # HL, on statin    -Overall patient is in stable cardiac health without symptoms concerning for angina or decompensated heart failure.  She is able to engage in activities equivalent to at least 4 METS without abnormal symptoms.  -  Reviewed prior cardiac diagnostic testing and cardiac diagnoses in detail with patient, and her son (over phone.)  Unfortunately they were not familiar with this diagnosis of SCAD.  Discussed pathophysiology, implications for future management.  Had not been seen in 2 years in cardiology clinic with [facility].  Will reassess with a TTE.  Recommend starting aspirin 81 mg daily.  If no significant abnormalities on TTE, patient may proceed with surgery without further cardiac diagnostic testing, recommend close monitoring of hemodynamics, judicious fluid management given history of recovered cardiomyopathy.  Advised on activity restrictions, patient does work a relatively physically demanding job, provided a work note communicating our recommendation that she not lift more than 20 pounds regularly.  Will screen for associated FMD with CTA head to thighs.  Continue remainder of cardiac regimen including lisinopril, metoprolol succinate, rosuvastatin.  Follow-up in 6 months, or sooner as needed.      Thank you for allowing our team to participate in the care of Angela Villasenor.  Please do not hesitate to call or page me with any questions or concerns.    Sincerely,     Christiano Edwards MD, [facility]  Cardiology  January 17, 2024      Kayla Lakhani, PA-KHADRA  [address]  [place] [address]      Total time spent on this encounter today: Greater than 60 minutes, providing care in this  encounter including, but not limited to, reviewing prior medical records, laboratory data, imaging studies, diagnostic studies, procedure notes, formulating an assessment and plan, recommendations, discussion and counseling with patient face to face, dictation.    Past Medical History:     The ASCVD Risk score (Josh DK, et al., 2019) failed to calculate for the following reasons:    Cannot find a previous HDL lab    Cannot find a previous total cholesterol lab  Patient Active Problem List   Diagnosis    Chronic heart failure with preserved ejection fraction (H)    Essential hypertension    Fatty liver    Gastroesophageal reflux disease    Hyperlipidemia    Type 2 diabetes mellitus (H)    Morbid obesity (H)       Past Surgical History:   Past Surgical History:   Procedure Laterality Date    COLONOSCOPY      CYSTOSCOPY, INJECT BOTOX N/A 1/20/2022    Procedure: EXAM UNDER ANESTHESIA WITH BOTOX INJECTION;  Surgeon: Misti Juan MD;  [facility]: [facility]       Medications (outpatient):  Current Outpatient Medications   Medication Sig Dispense Refill    esomeprazole (NEXIUM) 20 MG DR capsule       hydrocortisone, Perianal, (HYDROCORTISONE) 2.5 % cream Place rectally 2 times daily as needed for hemorrhoids 30 g 0    lactulose encephalopathy (CHRONULAC) 10 GM/15ML SOLUTION Take by mouth 3 times daily      lisinopril (ZESTRIL) 10 MG tablet Take 10 mg by mouth      metFORMIN (GLUCOPHAGE XR) 500 MG 24 hr tablet Take 4 tablets (2,000 mg) by mouth daily (with dinner) 120 tablet 3    metoprolol succinate ER (TOPROL-XL) 50 MG 24 hr tablet       ondansetron (ZOFRAN ODT) 4 MG ODT tab Take 1 tablet (4 mg) by mouth every 8 hours as needed for nausea 15 tablet 0    polyethylene glycol (MIRALAX) 17 g packet Take 1 packet by mouth 3 times daily      rosuvastatin (CRESTOR) 10 MG tablet       metFORMIN (GLUCOPHAGE) 500 MG tablet Take 500 mg by mouth (Patient not taking: Reported on 1/17/2024)         Allergies:  No Known  "Allergies    Social History:   History   Drug Use Unknown      History   Smoking Status    Never   Smokeless Tobacco    Never     Social History    Substance and Sexual Activity      Alcohol use: Never       Family History:  History reviewed. No pertinent family history.    Review of Systems:   A comprehensive 12 system review of systems was carried out.  Pertinent positives and negatives are noted above. Otherwise negative for contributory information.    Objective & Physical Exam:  /72   Pulse 67   Ht 1.626 m (5' 4\")   Wt 102.3 kg (225 lb 9.6 oz)   SpO2 100%   BMI 38.72 kg/m    Wt Readings from Last 2 Encounters:   01/17/24 102.3 kg (225 lb 9.6 oz)   12/07/23 103.4 kg (228 lb)     Body mass index is 38.72 kg/m .   Body surface area is 2.15 meters squared.    Constitutional: appears stated age, in no apparent distress, appears to be well nourished  Head: normocephalic, atraumatic  Neck: supple, trachea midline  Pulmonary: clear to auscultation bilaterally, no wheezes, no rales, no increased work of breathing  Cardiovascular: JVP normal, regular rate, regular rhythm, normal S1 and S2, no S3, S4, no murmur appreciated, no lower extremity edema  Gastrointestinal: no guarding, non-rigid   Neurologic: awake, alert, moves all extremities  Skin: no jaundice, warm on limited exam  Psychiatric: affect is normal, answers questions appropriately, oriented to self and place    Data reviewed:  Lab Results   Component Value Date    WBC 9.8 12/07/2023    WBC 8.9 01/28/2021    RBC 4.28 12/07/2023    RBC 3.99 01/28/2021    HGB 11.0 (L) 12/07/2023    HGB 11.1 (L) 01/28/2021    HCT 34.6 (L) 12/07/2023    HCT 35.0 01/28/2021    MCV 81 12/07/2023    MCV 88 01/28/2021    MCH 25.7 (L) 12/07/2023    MCH 27.8 01/28/2021    MCHC 31.8 12/07/2023    MCHC 31.7 01/28/2021    RDW 15.2 (H) 12/07/2023    RDW 13.8 01/28/2021     12/07/2023     01/28/2021     Sodium   Date Value Ref Range Status   12/07/2023 138 135 - 145 " mmol/L Final     Comment:     Reference intervals for this test were updated on 09/26/2023 to more accurately reflect our healthy population. There may be differences in the flagging of prior results with similar values performed with this method. Interpretation of those prior results can be made in the context of the updated reference intervals.    01/28/2021 137 133 - 144 mmol/L Final     Potassium   Date Value Ref Range Status   12/07/2023 4.6 3.4 - 5.3 mmol/L Final   08/25/2021 3.7 3.4 - 5.3 mmol/L Final   01/28/2021 4.1 3.4 - 5.3 mmol/L Final     Chloride   Date Value Ref Range Status   12/07/2023 103 98 - 107 mmol/L Final   08/25/2021 107 94 - 109 mmol/L Final   01/28/2021 107 94 - 109 mmol/L Final     Carbon Dioxide   Date Value Ref Range Status   01/28/2021 28 20 - 32 mmol/L Final     Carbon Dioxide (CO2)   Date Value Ref Range Status   12/07/2023 23 22 - 29 mmol/L Final   08/25/2021 25 20 - 32 mmol/L Final     Anion Gap   Date Value Ref Range Status   12/07/2023 12 7 - 15 mmol/L Final   08/25/2021 6 3 - 14 mmol/L Final   01/28/2021 2 (L) 3 - 14 mmol/L Final     Glucose   Date Value Ref Range Status   12/07/2023 105 (H) 70 - 99 mg/dL Final   08/25/2021 156 (H) 70 - 99 mg/dL Final   01/28/2021 135 (H) 70 - 99 mg/dL Final     Urea Nitrogen   Date Value Ref Range Status   12/07/2023 15.7 6.0 - 20.0 mg/dL Final   08/25/2021 9 7 - 30 mg/dL Final   01/28/2021 17 7 - 30 mg/dL Final     Creatinine   Date Value Ref Range Status   12/07/2023 0.88 0.51 - 0.95 mg/dL Final   01/28/2021 0.80 0.52 - 1.04 mg/dL Final     GFR Estimate   Date Value Ref Range Status   12/07/2023 82 >60 mL/min/1.73m2 Final   01/28/2021 90 >60 mL/min/[1.73_m2] Final     Comment:     Non  GFR Calc  Starting 12/18/2018, serum creatinine based estimated GFR (eGFR) will be   calculated using the Chronic Kidney Disease Epidemiology Collaboration   (CKD-EPI) equation.       Calcium   Date Value Ref Range Status   12/07/2023 9.4 8.6 -  10.0 mg/dL Final   01/28/2021 8.8 8.5 - 10.1 mg/dL Final     Bilirubin Total   Date Value Ref Range Status   12/07/2023 0.3 <=1.2 mg/dL Final   01/05/2021 0.6 0.2 - 1.3 mg/dL Final     Alkaline Phosphatase   Date Value Ref Range Status   12/07/2023 108 40 - 150 U/L Final     Comment:     Reference intervals for this test were updated on 11/14/2023 to more accurately reflect our healthy population. There may be differences in the flagging of prior results with similar values performed with this method. Interpretation of those prior results can be made in the context of the updated reference intervals.   01/05/2021 106 40 - 150 U/L Final     ALT   Date Value Ref Range Status   12/07/2023 13 0 - 50 U/L Final     Comment:     Reference intervals for this test were updated on 6/12/2023 to more accurately reflect our healthy population. There may be differences in the flagging of prior results with similar values performed with this method. Interpretation of those prior results can be made in the context of the updated reference intervals.     01/05/2021 37 0 - 50 U/L Final     AST   Date Value Ref Range Status   12/07/2023 20 0 - 45 U/L Final     Comment:     Reference intervals for this test were updated on 6/12/2023 to more accurately reflect our healthy population. There may be differences in the flagging of prior results with similar values performed with this method. Interpretation of those prior results can be made in the context of the updated reference intervals.   01/05/2021 25 0 - 45 U/L Final     Lab Results   Component Value Date    A1C 6.3 12/07/2023          Thank you for allowing me to participate in the care of your patient.      Sincerely,     Christiano Edwards MD     [facility]  cc:   Kayla Lakhani, PA-C  [address]  [place] [address]

## 2024-01-22 ENCOUNTER — VIRTUAL VISIT (OUTPATIENT)
Dept: SURGERY | Facility: CLINIC | Age: 44
End: 2024-01-22
Payer: COMMERCIAL

## 2024-01-22 DIAGNOSIS — E66.01 MORBID OBESITY (H): Primary | ICD-10-CM
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
## 2024-01-26 ENCOUNTER — HOSPITAL ENCOUNTER (OUTPATIENT)
Dept: CT IMAGING | Facility: CLINIC | Age: 44
Discharge: HOME | End: 2024-01-26
Attending: INTERNAL MEDICINE | Admitting: INTERNAL MEDICINE
Payer: COMMERCIAL

## 2024-01-26 ENCOUNTER — TELEPHONE (OUTPATIENT)
Dept: CARDIOLOGY | Facility: CLINIC | Age: 44
End: 2024-01-26

## 2024-01-26 DIAGNOSIS — I25.42 SPONTANEOUS DISSECTION OF CORONARY ARTERY: ICD-10-CM

## 2024-01-26 DIAGNOSIS — I77.3 FIBROMUSCULAR DYSPLASIA (H): ICD-10-CM

## 2024-01-26 LAB
CREAT BLD-MCNC: 0.6 MG/DL (ref 0.5–1)
EGFRCR SERPLBLD CKD-EPI 2021: >60 ML/MIN/1.73M2

## 2024-01-26 PROCEDURE — 250N000009 HC RX 250: Performed by: INTERNAL MEDICINE

## 2024-01-26 PROCEDURE — 82565 ASSAY OF CREATININE: CPT

## 2024-01-26 PROCEDURE — 71275 CT ANGIOGRAPHY CHEST: CPT

## 2024-01-26 PROCEDURE — 250N000011 HC RX IP 250 OP 636: Performed by: INTERNAL MEDICINE

## 2024-01-26 RX ORDER — IOPAMIDOL 755 MG/ML
100 INJECTION, SOLUTION INTRAVASCULAR ONCE
Status: COMPLETED | OUTPATIENT
Start: 2024-01-26 | End: 2024-01-26

## 2024-01-26 RX ADMIN — IOPAMIDOL 100 ML: 755 INJECTION, SOLUTION INTRAVENOUS at 12:40

## 2024-01-26 RX ADMIN — SODIUM CHLORIDE 80 ML: 9 INJECTION, SOLUTION INTRAVENOUS at 12:41

## 2024-01-26 NOTE — TELEPHONE ENCOUNTER
Spoke with son who states at Dr. Jerry STERN 1-17-24 in [facility] they left a copy of the paperwork that needed to be filled out for her job.    No paperwork received yet.  Son states he has a copy of the Paperwork at home and will bring to [facility] for be send to [place].     Patient called requesting  call back  -     Last Dr. Jerry STERN 1-17-24-   Consult indication: SCAD  - Patient is planning to undergo bariatric surgery. Overall she reports that she has been doing well from a cardiac perspective   Assessment and Plan/Recommendations:     # Recovered cardiomyopathy. Euvolemic.   # Suspected mid LAD SCAD, [facility] coronary CTA 12/6/2021, CMR 2/24/2022 with subendocardial infarct in the corresponding territory  # HTN, stable  # HL, on statin     -Overall patient is in stable cardiac health without symptoms concerning for angina or decompensated heart failure.  She is able to engage in activities equivalent to at least 4 METS without abnormal symptoms.  -  Reviewed prior cardiac diagnostic testing and cardiac diagnoses in detail with patient, and her son (over phone.)  Unfortunately they were not familiar with this diagnosis of SCAD.  Discussed pathophysiology, implications for future management.  Had not been seen in 2 years in cardiology clinic with [facility].  Will reassess with a TTE.  Recommend starting aspirin 81 mg daily.  If no significant abnormalities on TTE, patient may proceed with surgery without further cardiac diagnostic testing, recommend close monitoring of hemodynamics, judicious fluid management given history of recovered cardiomyopathy.  Advised on activity restrictions, patient does work a relatively physically demanding job, provided a work note communicating our recommendation that she not lift more than 20 pounds regularly.  Will screen for associated FMD with CTA head to thighs.  Continue remainder of cardiac regimen including lisinopril, metoprolol succinate, rosuvastatin.  Follow-up in 6 months,  or sooner as needed.

## 2024-01-29 NOTE — TELEPHONE ENCOUNTER
1 st check in desk will have the lead  person who received the paperwork on Friday call Team later today.     Checked with admission check in 1 st floor desk. Paperwork was received and given to Hospital CT Dept. - They do not have any paperwork.    No paperwork at  cardiology check in desk.     Spoke with Son who states Patient brought paperwork on Friday to [facility] Patient may of givern in to someone on the 1st floor desk.    Spoke with [facility] - regarding paperwork - No paperwork found in Clinic areas.     Message left for patient regarding Paperwork needed. No answer.

## 2024-01-29 NOTE — TELEPHONE ENCOUNTER
Spoke with Son, Armando, who will have Patient get another copy of the work forms needed to be completed.    Asked that the forms be brought to Cardiology Clinic in  [place]. [address]  - full address given, and leave paper work at desk.   Son agrees with Plan.     Received message from Check in Desk   Went to CT Dept spoke with tech. No paper work was received.  Went to Film room No paperwork received.     Last Dr. Edwards OV 1-17-24 in [place].   Assessment and Plan/Recommendations:     # Recovered cardiomyopathy. Euvolemic.   # Suspected mid LAD SCAD, [facility] coronary CTA 12/6/2021, CMR 2/24/2022 with subendocardial infarct in the corresponding territory  # HTN, stable  # HL, on statin     -Overall patient is in stable cardiac health without symptoms concerning for angina or decompensated heart failure.  She is able to engage in activities equivalent to at least 4 METS without abnormal symptoms.  -  Reviewed prior cardiac diagnostic testing and cardiac diagnoses in detail with patient, and her son (over phone.)  Unfortunately they were not familiar with this diagnosis of SCAD.  Discussed pathophysiology, implications for future management.  Had not been seen in 2 years in cardiology clinic with [facility].  Will reassess with a TTE.  Recommend starting aspirin 81 mg daily.  If no significant abnormalities on TTE, patient may proceed with surgery without further cardiac diagnostic testing, recommend close monitoring of hemodynamics, judicious fluid management given history of recovered cardiomyopathy.  Advised on activity restrictions, patient does work a relatively physically demanding job, provided a work note communicating our recommendation that she not lift more than 20 pounds regularly.  Will screen for associated FMD with CTA head to thighs.  Continue remainder of cardiac regimen including lisinopril, metoprolol succinate, rosuvastatin.  Follow-up in 6 months,

## 2024-01-30 NOTE — TELEPHONE ENCOUNTER
Paperwork for employer received and available for  Dr. Edwards to complete.     On paperwork first page there  is ta portion for  Patient. To complete.   Detail messaged left for son that once Paperwork is completed we will call to see if Patient will  paperwork to complete her portion or if it is to be mailed.    Patient would like a copy for herself also.

## 2024-01-31 NOTE — TELEPHONE ENCOUNTER
Patient and son states  she is currently working 5 hr/day 5 days per week.  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 At this time does not push a full cart to a different Dept.     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    Employer  put Patient in a different Dept and  currently working in after Dr. Jerry STERN , on 1-17-24.

## 2024-02-04 NOTE — RESULT ENCOUNTER NOTE
Results reviewed, please let the patient know that overall findings are reassuring, no evidence of FMD. They did find this mass in the RLQ, recommend she follow up with her OB/GYN and/or PCP on this thanks

## 2024-02-05 ENCOUNTER — TELEPHONE (OUTPATIENT)
Dept: SURGERY | Facility: CLINIC | Age: 44
End: 2024-02-05

## 2024-02-05 ENCOUNTER — TELEPHONE (OUTPATIENT)
Dept: CARDIOLOGY | Facility: CLINIC | Age: 44
End: 2024-02-05

## 2024-02-05 NOTE — TELEPHONE ENCOUNTER
With the help of a Prydeinig , we attempted to reach Angela 3 times, but did not connect with her. A message was left and she will need to have clinical staff set up a new appointment with her.

## 2024-02-05 NOTE — TELEPHONE ENCOUNTER
Form completed and signed with limitation of not lifting >25#. Called son. He asks that we fax the form to [employer] and mail a copy to their home.    Form faxed to [employer] at [phone].  Copy mailed to patient.

## 2024-02-05 NOTE — TELEPHONE ENCOUNTER
Spoke with Son, Dr. Edwards message reply reviewed.  Son verbalized understanding of results and agrees with Plan.     Patient and Son will call PCP Clinic regarding Rt quadrant mass. and at that time will ask for referral if needed to GYN Dr.    Result to be faxed to [facility] to Lorena Garcia DO.  for recommendations regarding fat containing mass in Rt lower quadrant.         ---- Message from Christiano Edwards MD sent at 2/4/2024  4:16 PM CST -----  Results reviewed, please let the patient know that overall findings are reassuring, no evidence of FMD. They did find this mass in the RLQ, recommend she follow up with her OB/GYN and/or PCP on this thanks      CTA Chest 1-26-24.   1. Normal thoracoabdominal aorta and visceral arteries. Of note, renal  arteries appear patent without obvious stigmata of fibromuscular  dysplasia. Similarly, both external iliac arteries appear patent.  2. Arteries throughout both lower extremities are widely patent.  3. Macroscopic fat-containing 8.2 cm mass in the right lower quadrant,  may be ovarian in origin. This may be a teratoma or other lipomatous  tumor. Minimal internal soft tissue, though MRI may be obtained for  most definitive evaluation as differential diagnosis is broad.

## 2024-02-08 ENCOUNTER — VIRTUAL VISIT (OUTPATIENT)
Dept: SURGERY | Facility: CLINIC | Age: 44
End: 2024-02-08
Payer: COMMERCIAL

## 2024-02-08 DIAGNOSIS — E66.01 MORBID OBESITY (H): Primary | ICD-10-CM
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
## 2024-02-21 ENCOUNTER — OFFICE VISIT (OUTPATIENT)
Dept: URGENT CARE | Facility: URGENT CARE | Age: 44
End: 2024-02-21
Payer: COMMERCIAL

## 2024-02-21 VITALS
WEIGHT: 226 LBS | TEMPERATURE: 100.3 F | HEART RATE: 84 BPM | SYSTOLIC BLOOD PRESSURE: 159 MMHG | OXYGEN SATURATION: 100 % | BODY MASS INDEX: 38.79 KG/M2 | DIASTOLIC BLOOD PRESSURE: 93 MMHG | RESPIRATION RATE: 20 BRPM

## 2024-02-21 DIAGNOSIS — R50.9 FEVER AND CHILLS: ICD-10-CM

## 2024-02-21 DIAGNOSIS — R05.1 ACUTE COUGH: ICD-10-CM

## 2024-02-21 DIAGNOSIS — R07.0 THROAT PAIN: Primary | ICD-10-CM

## 2024-02-21 DIAGNOSIS — J10.1 INFLUENZA A: ICD-10-CM

## 2024-02-21 LAB
DEPRECATED S PYO AG THROAT QL EIA: NEGATIVE
FLUAV AG SPEC QL IA: POSITIVE
FLUBV AG SPEC QL IA: NEGATIVE
GROUP A STREP BY PCR: NOT DETECTED

## 2024-02-21 PROCEDURE — 99213 OFFICE O/P EST LOW 20 MIN: CPT | Performed by: FAMILY MEDICINE

## 2024-02-21 PROCEDURE — 87651 STREP A DNA AMP PROBE: CPT | Performed by: FAMILY MEDICINE

## 2024-02-21 PROCEDURE — 87804 INFLUENZA ASSAY W/OPTIC: CPT | Performed by: FAMILY MEDICINE

## 2024-02-21 RX ORDER — IBUPROFEN 200 MG
400 TABLET ORAL ONCE
Status: COMPLETED | OUTPATIENT
Start: 2024-02-21 | End: 2024-02-21

## 2024-02-21 RX ORDER — OSELTAMIVIR PHOSPHATE 75 MG/1
75 CAPSULE ORAL 2 TIMES DAILY
Qty: 10 CAPSULE | Refills: 0 | Status: SHIPPED | OUTPATIENT
Start: 2024-02-21 | End: 2024-02-26

## 2024-02-21 RX ADMIN — Medication 400 MG: at 11:04

## 2024-02-21 NOTE — PROGRESS NOTES
SUBJECTIVE: Angela Villasenor is a 44 year old female presenting with a chief complaint of fever, cough , and sore throat.  Onset of symptoms was day(s) ago.  Predisposing factors include None.    Past Medical History:   Diagnosis Date    Cardiomyopathy (H)     Chronic heart failure with preserved ejection fraction (H) 12/14/2021    DM2 (diabetes mellitus, type 2) (H)     External hemorrhoids     Fatty liver 11/12/2021    Gastroesophageal reflux disease     History of H. pylori infection     Hypertension     Hypertriglyceridemia     Obesity      No Known Allergies  Social History     Tobacco Use    Smoking status: Never    Smokeless tobacco: Never   Substance Use Topics    Alcohol use: Never       ROS:  SKIN: no rash  GI: no vomiting    OBJECTIVE:  BP (!) 159/93 (BP Location: Left arm, Patient Position: Sitting, Cuff Size: Adult Regular)   Pulse 84   Temp 100.3  F (37.9  C) (Tympanic)   Resp 20   Wt 102.5 kg (226 lb)   SpO2 100%   BMI 38.79 kg/m  GENERAL APPEARANCE: healthy, alert and no distress  EYES: EOMI,  PERRL, conjunctiva clear  HENT: ear canals and TM's normal.  Nose and mouth without ulcers, erythema or lesions  RESP: lungs clear to auscultation - no rales, rhonchi or wheezes  SKIN: no suspicious lesions or rashes      ICD-10-CM    1. Throat pain  R07.0 Streptococcus A Rapid Screen w/Reflex to PCR     Group A Streptococcus PCR Throat Swab     CANCELED: Streptococcus A Rapid Screen w/Reflex to PCR      2. Fever and chills  R50.9 Influenza A & B Antigen     Streptococcus A Rapid Screen w/Reflex to PCR     Group A Streptococcus PCR Throat Swab      3. Acute cough  R05.1 Influenza A & B Antigen      4. Influenza A  J10.1 oseltamivir (TAMIFLU) 75 MG capsule        Fluids/Rest, f/u if worse/not any better    
3

## 2024-02-23 ENCOUNTER — TELEPHONE (OUTPATIENT)
Dept: CARDIOLOGY | Facility: CLINIC | Age: 44
End: 2024-02-23
Payer: COMMERCIAL

## 2024-02-23 NOTE — TELEPHONE ENCOUNTER
Patient's son called with patient to ask about more forms for her workplace. They will drop off a copy to see if Dr. Edwards is the appropriate person to complete it. They will give one to the PCP too.    Reviewed that patient needs to schedule the echo. CTA is set for 3/4/2024. Son will call the [facility] to do this.

## 2024-02-26 ENCOUNTER — OFFICE VISIT (OUTPATIENT)
Dept: URGENT CARE | Facility: URGENT CARE | Age: 44
End: 2024-02-26
Payer: COMMERCIAL

## 2024-02-26 ENCOUNTER — ANCILLARY PROCEDURE (OUTPATIENT)
Dept: GENERAL RADIOLOGY | Facility: CLINIC | Age: 44
End: 2024-02-26
Attending: FAMILY MEDICINE
Payer: COMMERCIAL

## 2024-02-26 VITALS
RESPIRATION RATE: 14 BRPM | HEART RATE: 78 BPM | TEMPERATURE: 98.9 F | BODY MASS INDEX: 38.28 KG/M2 | OXYGEN SATURATION: 99 % | SYSTOLIC BLOOD PRESSURE: 156 MMHG | DIASTOLIC BLOOD PRESSURE: 91 MMHG | WEIGHT: 223 LBS

## 2024-02-26 DIAGNOSIS — J06.9 UPPER RESPIRATORY TRACT INFECTION, UNSPECIFIED TYPE: Primary | ICD-10-CM

## 2024-02-26 DIAGNOSIS — J01.90 ACUTE SINUSITIS WITH COEXISTING CONDITION REQUIRING PROPHYLACTIC TREATMENT: ICD-10-CM

## 2024-02-26 DIAGNOSIS — R07.0 THROAT PAIN: ICD-10-CM

## 2024-02-26 LAB
DEPRECATED S PYO AG THROAT QL EIA: NEGATIVE
GROUP A STREP BY PCR: NOT DETECTED

## 2024-02-26 PROCEDURE — 99214 OFFICE O/P EST MOD 30 MIN: CPT | Performed by: FAMILY MEDICINE

## 2024-02-26 PROCEDURE — 87651 STREP A DNA AMP PROBE: CPT | Performed by: FAMILY MEDICINE

## 2024-02-26 PROCEDURE — 71046 X-RAY EXAM CHEST 2 VIEWS: CPT | Mod: TC | Performed by: RADIOLOGY

## 2024-02-26 RX ORDER — BENZONATATE 200 MG/1
200 CAPSULE ORAL 3 TIMES DAILY PRN
Qty: 21 CAPSULE | Refills: 0 | Status: SHIPPED | OUTPATIENT
Start: 2024-02-26 | End: 2024-03-04

## 2024-02-26 RX ORDER — DOXYCYCLINE 100 MG/1
100 TABLET ORAL 2 TIMES DAILY
Qty: 14 TABLET | Refills: 0 | Status: SHIPPED | OUTPATIENT
Start: 2024-02-26 | End: 2024-03-04

## 2024-02-26 RX ORDER — CETIRIZINE HYDROCHLORIDE 10 MG/1
10 TABLET ORAL DAILY
Qty: 14 TABLET | Refills: 0 | Status: SHIPPED | OUTPATIENT
Start: 2024-02-26 | End: 2024-03-11

## 2024-02-26 NOTE — PROGRESS NOTES
"SUBJECTIVE: Angela Villasenor is a 44 year old female presenting with a chief complaint of \"cold symptoms\".  Onset of symptoms was 1 week(s) ago.  Course of illness is worsening.    Severity moderate  Current and Associated symptoms: cough - productive  Treatment measures tried include Tylenol/Ibuprofen.  Predisposing factors include tamiflu.    Past Medical History:   Diagnosis Date    Cardiomyopathy (H)     Chronic heart failure with preserved ejection fraction (H) 12/14/2021    DM2 (diabetes mellitus, type 2) (H)     External hemorrhoids     Fatty liver 11/12/2021    Gastroesophageal reflux disease     History of H. pylori infection     Hypertension     Hypertriglyceridemia     Obesity      No Known Allergies  Social History     Tobacco Use    Smoking status: Never    Smokeless tobacco: Never   Substance Use Topics    Alcohol use: Never       ROS:  SKIN: no rash  GI: no vomiting    OBJECTIVE:  BP (!) 156/91   Pulse 78   Temp 98.9  F (37.2  C) (Tympanic)   Resp 14   Wt 101.2 kg (223 lb)   SpO2 99%   BMI 38.28 kg/m  GENERAL APPEARANCE: healthy, alert and no distress  EYES: EOMI,  PERRL, conjunctiva clear  HENT: TM's normal bilaterally, rhinorrhea yellow, and oral mucous membranes moist, no erythema noted  RESP: lungs clear to auscultation - no rales, rhonchi or wheezes  SKIN: no suspicious lesions or rashes    Xray without acute findings, no pneumonia read by Luan Miner D.O.      ICD-10-CM    1. Upper respiratory tract infection, unspecified type  J06.9 XR Chest 2 Views     benzonatate (TESSALON) 200 MG capsule      2. Throat pain  R07.0 Streptococcus A Rapid Screen w/Reflex to PCR - Clinic Collect     Group A Streptococcus PCR Throat Swab      3. Acute sinusitis with coexisting condition requiring prophylactic treatment  J01.90 doxycycline monohydrate (ADOXA) 100 MG tablet     cetirizine (ZYRTEC) 10 MG tablet          Fluids/Rest, f/u if worse/not any better    "

## 2024-02-28 ENCOUNTER — HOSPITAL ENCOUNTER (OUTPATIENT)
Dept: CARDIOLOGY | Facility: CLINIC | Age: 44
Discharge: HOME | End: 2024-02-28
Attending: INTERNAL MEDICINE | Admitting: INTERNAL MEDICINE
Payer: COMMERCIAL

## 2024-02-28 ENCOUNTER — OFFICE VISIT (OUTPATIENT)
Dept: URGENT CARE | Facility: URGENT CARE | Age: 44
End: 2024-02-28
Payer: COMMERCIAL

## 2024-02-28 VITALS
TEMPERATURE: 98 F | OXYGEN SATURATION: 98 % | RESPIRATION RATE: 20 BRPM | SYSTOLIC BLOOD PRESSURE: 188 MMHG | DIASTOLIC BLOOD PRESSURE: 98 MMHG | HEART RATE: 78 BPM

## 2024-02-28 DIAGNOSIS — I10 UNCONTROLLED HYPERTENSION: Primary | ICD-10-CM

## 2024-02-28 DIAGNOSIS — I25.42 SPONTANEOUS DISSECTION OF CORONARY ARTERY: ICD-10-CM

## 2024-02-28 DIAGNOSIS — I50.32 CHRONIC HEART FAILURE WITH PRESERVED EJECTION FRACTION (H): ICD-10-CM

## 2024-02-28 DIAGNOSIS — R09.81 CONGESTION OF PARANASAL SINUS: ICD-10-CM

## 2024-02-28 LAB — LVEF ECHO: NORMAL

## 2024-02-28 PROCEDURE — 93306 TTE W/DOPPLER COMPLETE: CPT

## 2024-02-28 PROCEDURE — 93306 TTE W/DOPPLER COMPLETE: CPT | Mod: 26 | Performed by: INTERNAL MEDICINE

## 2024-02-28 PROCEDURE — 99214 OFFICE O/P EST MOD 30 MIN: CPT | Performed by: FAMILY MEDICINE

## 2024-02-28 RX ORDER — FLUTICASONE PROPIONATE 50 MCG
1 SPRAY, SUSPENSION (ML) NASAL DAILY
Qty: 16 G | Refills: 0 | Status: SHIPPED | OUTPATIENT
Start: 2024-02-28

## 2024-02-28 RX ORDER — LISINOPRIL 20 MG/1
20 TABLET ORAL DAILY
Qty: 30 TABLET | Refills: 0 | Status: SHIPPED | OUTPATIENT
Start: 2024-02-28 | End: 2024-03-29

## 2024-02-28 NOTE — PATIENT INSTRUCTIONS
Okay to increase lisinopril 20 mg daily - take for 1 week, go to primary clinic for blood pressure check and to determine dosage     Continue with current antibiotic for sinus infection   Use flonase to help with sinus congestion, this will help with ear discomfort (eustachian tube dysfunction)

## 2024-02-28 NOTE — PROGRESS NOTES
SUBJECTIVE:  Chief Complaint   Patient presents with    Urgent Care     Pt was seen in cardiology BP was 180/108   Pt was told to come to      [place]  on phone    Angela Villasenor is a 44 year old female who presents with a chief complaint of elevated BP.    Medical history- DM, HTN, Cardiomyopathy, CHF     Takes lisinopril in evening, took last evening.  Was seen in Cardiology clinic and was noted that was elevated.  Usually well controlled on current medication    Was sick with influenza, now on antibiotic for sinus infection.  Feels ears are under water and plugged.    Past Medical History:   Diagnosis Date    Cardiomyopathy (H)     Chronic heart failure with preserved ejection fraction (H) 12/14/2021    DM2 (diabetes mellitus, type 2) (H)     External hemorrhoids     Fatty liver 11/12/2021    Gastroesophageal reflux disease     History of H. pylori infection     Hypertension     Hypertriglyceridemia     Obesity      Current Outpatient Medications   Medication Sig Dispense Refill    aspirin 81 MG EC tablet Take 1 tablet (81 mg) by mouth daily 90 tablet 3    benzonatate (TESSALON) 200 MG capsule Take 1 capsule (200 mg) by mouth 3 times daily as needed for cough 21 capsule 0    cetirizine (ZYRTEC) 10 MG tablet Take 1 tablet (10 mg) by mouth daily for 14 days 14 tablet 0    doxycycline monohydrate (ADOXA) 100 MG tablet Take 1 tablet (100 mg) by mouth 2 times daily for 7 days 14 tablet 0    esomeprazole (NEXIUM) 20 MG DR capsule       hydrocortisone, Perianal, (HYDROCORTISONE) 2.5 % cream Place rectally 2 times daily as needed for hemorrhoids 30 g 0    lactulose encephalopathy (CHRONULAC) 10 GM/15ML SOLUTION Take by mouth 3 times daily      lisinopril (ZESTRIL) 10 MG tablet Take 10 mg by mouth      metFORMIN (GLUCOPHAGE XR) 500 MG 24 hr tablet Take 4 tablets (2,000 mg) by mouth daily (with dinner) 120 tablet 3    metoprolol succinate ER (TOPROL-XL) 50 MG 24 hr tablet       ondansetron (ZOFRAN ODT) 4 MG ODT  tab Take 1 tablet (4 mg) by mouth every 8 hours as needed for nausea 15 tablet 0    polyethylene glycol (MIRALAX) 17 g packet Take 1 packet by mouth 3 times daily      rosuvastatin (CRESTOR) 10 MG tablet       metFORMIN (GLUCOPHAGE) 500 MG tablet Take 500 mg by mouth (Patient not taking: Reported on 1/17/2024)       Social History     Tobacco Use    Smoking status: Never    Smokeless tobacco: Never   Substance Use Topics    Alcohol use: Never       ROS:  Review of systems negative except as stated above.    EXAM:   BP (!) 188/98   Pulse 78   Temp 98  F (36.7  C) (Tympanic)   Resp 20   SpO2 98%   GENERAL APPEARANCE: healthy, alert and no distress  EARS: bilateral ear canals and TMs normal  PSYCH:alert, affect bright      ASSESSMENT/PLAN:  (I10) Uncontrolled hypertension  (primary encounter diagnosis)  Plan: lisinopril (ZESTRIL) 20 MG tablet            (R09.81) Congestion of paranasal sinus  Plan: fluticasone (FLONASE) 50 MCG/ACT nasal spray        Finish full course of Doxycycline      Reassurance given, discussed uncontrolled HTN in setting of influenza illness and sinus infection.  Due to high risk comorbid medical illness, will increase lisinopril 20 mg daily and patient instructed to follow up with primary provider for recheck and adjustment as needed.    Encourage to continue with current medications, finish full course of antibiotic for sinus infection.  RX flonase given to help with sinus congestion, reviewed that this may also help with eustachian tube dysfunction that is causing ear discomfort.    Follow up with primary provider in 1 week    Lee Alcala MD  February 28, 2024 4:20 PM

## 2024-02-29 ENCOUNTER — OFFICE VISIT (OUTPATIENT)
Dept: CARDIOLOGY | Facility: CLINIC | Age: 44
End: 2024-02-29
Payer: COMMERCIAL

## 2024-02-29 ENCOUNTER — TELEPHONE (OUTPATIENT)
Dept: CARDIOLOGY | Facility: CLINIC | Age: 44
End: 2024-02-29

## 2024-02-29 VITALS
OXYGEN SATURATION: 99 % | WEIGHT: 227 LBS | HEART RATE: 80 BPM | HEIGHT: 64 IN | BODY MASS INDEX: 38.76 KG/M2 | DIASTOLIC BLOOD PRESSURE: 82 MMHG | SYSTOLIC BLOOD PRESSURE: 158 MMHG

## 2024-02-29 DIAGNOSIS — I25.42 SPONTANEOUS DISSECTION OF CORONARY ARTERY: Primary | ICD-10-CM

## 2024-02-29 DIAGNOSIS — I42.9 CARDIOMYOPATHY, UNSPECIFIED TYPE (H): ICD-10-CM

## 2024-02-29 PROCEDURE — 99214 OFFICE O/P EST MOD 30 MIN: CPT | Performed by: PHYSICIAN ASSISTANT

## 2024-02-29 NOTE — LETTER
2024    [facility]  [address] [facility] [address]    RE: Angela Villasenor       Dear Colleague,     I had the pleasure of seeing Angela Villasenor in the [facility].      CARDIOLOGY CLINIC PROGRESS NOTE    DOS: 2024      Angela Villasenor  : 1980, 44 year old  MRN: [id number]      Primary cardiologist: Dr. 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       Patient had been followed closely by [facility] cardiology.  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    TTE 2021 LVEF 40%, akinesis of the mid apical anteroseptal, anterior, inferoseptal wall segments as well as the apex.    Coronary CTA 2021 report was addended 2022, initial report noted no significant abnormalities, on review, there were findings in the mid LAD suggestive of spontaneous coronary artery dissection.    Cardiac MRI 2022 demonstrated findings consistent with small subendocardial infarction involving the mid LAD, LVEF 57%, RVEF 60%, no significant valvular abnormalities.     She met Dr. Edwards 24.     Interval History, reviewed:  Echo 24:  Interpretation Summary   The left ventricle is normal in structure, function and size.  Normal valvular function Technically difficult, suboptimal study.      Patient is planning to undergo bariatric surgery.      Preop is scheduled .     Overall she reports that she has been doing well from a cardiac perspective.  She denies any exertional chest pain, chest pressure, abnormal shortness of breath.  She is able to climb a flight of stairs, and work around her home engaging activities such as vacuuming, heavy cleaning, without abnormal symptoms.  She does note occasional chest wall discomfort, described as a numbness over her left breast,  this has been present for several years, nonexertional in nature, relieved with massage over the area.  She works a physically demanding job with [employer]. Dr. Edwards filled out forms with lifting restrictions.  She has been on unpaid leave due to her lifting restrictions. But she would like to go back to work and needs a letter that states she can work without restrictions. She says when they lift they work together, so she will not be lifting more than 25-30 lbs on her own.     She was recently diagnosed with influenza.     BP has been elevated. Currently on lisinopril 20 mg. Normally on 10 mg.     We called  services, but were on hold for half hour.  Eventually, we did call her son, who interpreted for us.  Additionally, she spoke quite a bit of English and had no questions for me at the end.       ROS:  Skin:  not assessed     Eyes:  not assessed    ENT:  not assessed    Respiratory:  Negative    Cardiovascular:  Negative    Gastroenterology: not assessed    Genitourinary:  not assessed    Musculoskeletal:  not assessed    Neurologic:  not assessed    Psychiatric:  not assessed    Heme/Lymph/Imm:  not assessed    Endocrine:  not assessed      PAST MEDICAL HISTORY:  Past Medical History:   Diagnosis Date    Cardiomyopathy (H)     Chronic heart failure with preserved ejection fraction (H) 12/14/2021    DM2 (diabetes mellitus, type 2) (H)     External hemorrhoids     Fatty liver 11/12/2021    Gastroesophageal reflux disease     History of H. pylori infection     Hypertension     Hypertriglyceridemia     Obesity        PAST SURGICAL HISTORY:  Past Surgical History:   Procedure Laterality Date    COLONOSCOPY      CYSTOSCOPY, INJECT BOTOX N/A 1/20/2022    Procedure: EXAM UNDER ANESTHESIA WITH BOTOX INJECTION;  Surgeon: Misti Juan MD;  Location: [facility]       SOCIAL HISTORY:  Social History     Socioeconomic History    Marital status:    Tobacco Use    Smoking status: Never    Smokeless  "tobacco: Never   Substance and Sexual Activity    Alcohol use: Never    Drug use: Never    Sexual activity: Yes     Partners: Male       FAMILY HISTORY:  No family history on file.    MEDS: aspirin 81 MG EC tablet, Take 1 tablet (81 mg) by mouth daily  benzonatate (TESSALON) 200 MG capsule, Take 1 capsule (200 mg) by mouth 3 times daily as needed for cough  cetirizine (ZYRTEC) 10 MG tablet, Take 1 tablet (10 mg) by mouth daily for 14 days  doxycycline monohydrate (ADOXA) 100 MG tablet, Take 1 tablet (100 mg) by mouth 2 times daily for 7 days  esomeprazole (NEXIUM) 20 MG DR capsule,   fluticasone (FLONASE) 50 MCG/ACT nasal spray, Spray 1 spray into both nostrils daily  hydrocortisone, Perianal, (HYDROCORTISONE) 2.5 % cream, Place rectally 2 times daily as needed for hemorrhoids  lactulose encephalopathy (CHRONULAC) 10 GM/15ML SOLUTION, Take by mouth 3 times daily  lisinopril (ZESTRIL) 10 MG tablet, Take 10 mg by mouth  lisinopril (ZESTRIL) 20 MG tablet, Take 1 tablet (20 mg) by mouth daily (Patient taking differently: Take 20 mg by mouth daily Pt is only taking this 20 mg for 1 week due to having influenza A.)  metFORMIN (GLUCOPHAGE XR) 500 MG 24 hr tablet, Take 4 tablets (2,000 mg) by mouth daily (with dinner)  metoprolol succinate ER (TOPROL-XL) 50 MG 24 hr tablet,   ondansetron (ZOFRAN ODT) 4 MG ODT tab, Take 1 tablet (4 mg) by mouth every 8 hours as needed for nausea  polyethylene glycol (MIRALAX) 17 g packet, Take 1 packet by mouth 3 times daily  rosuvastatin (CRESTOR) 10 MG tablet,     No current facility-administered medications on file prior to visit.      ALLERGIES: No Known Allergies    PHYSICAL EXAM:  Vitals: BP (!) 158/82 (BP Location: Right arm, Patient Position: Sitting, Cuff Size: Adult Large)   Pulse 80   Ht 1.626 m (5' 4\")   Wt 103 kg (227 lb)   SpO2 99%   BMI 38.96 kg/m    Constitutional:  cooperative, alert and oriented, well developed, well nourished, in no acute distress        Skin:  warm " and dry to the touch, no apparent skin lesions or masses noted        Head:  normocephalic, no masses or lesions        Eyes:  pupils equal and round;conjunctivae and lids unremarkable;sclera white        ENT:  no pallor or cyanosis        Neck:           Respiratory:  clear to auscultation        Cardiac: regular rhythm;normal S1 and S2                  GI:    obese      Vascular:                                        Extremities and Musculoskeletal:           Neurological:  no gross motor deficits;affect appropriate              LABS/DATA:  I reviewed the following:  Echo 2/28/24:  Interpretation Summary   The left ventricle is normal in structure, function and size.  Normal valvular function Technically difficult, suboptimal study.      CARDIAC MRI 2/24/2022:  1. Findings are consistent with small subendocardial infarction involving the mid LAD which in retrospect was demonstrated on coronary CTA from 12/2021. Findings on CCTA suggest to me spontaneous coronary artery dissection involving the mid LAD.  - There is discrete myocardial edema involving the apical septum and apical anterior segments with wall  motion abnormality involving the mid anteroseptum and apical septal segment which are hypokinetic.  - Discrete hypoperfusion involving the mid anteroseptum in early-Jean-Pierre enhancement supports LAD involvement.  2. Normal LV size and preserved LVEF=57% but regional wall motion abnormalities. Normal wall thickness.   - No evidence of infiltrative process.  3. Normal RV size and systolic function. RVEF=60%.  4. No significant valvular disease.      CT CORONARY ANGIOGRAM 12/6/2021:  1. No CTA evidence for CAD.  - Ca score zero.  - no plaque or stenosis seen.  - no coronary lesion to account for symptoms.  2. Normal caliber ascending aorta and root.  3. Normal appearing pericardium.  4. Please see separate radiology report for review of non-cardiovascular structures.       TTE 10/28/2021:  1. Normal LV size, mild LV  wall thickness, estimated EF 40%.  2. Akinesis involving the mid to apical anteroseptum, anterior and inferoseptum wall segments as well as the apex. No left ventricular thrombus was seen.   3. The RV is not well-visualized, function appears to be normal.  4. No hemodynamically significant valvular abnormalities.  5. No pericardial effusion.       TTE 9/22/2021:  1. Normal LV size, mild LV wall thickness, estimated EF 40%.  2. Akinesis involving the mid to apical anteroseptum, anterior and inferoseptum wall segments as well as the apex. No left ventricular thrombus was seen.   3. The RV is not well-visualized, function appears to be normal.  4. No hemodynamically significant valvular abnormalities.  5. No pericardial effusion.       CT PULMONARY ANGIOGRAM 8/25/2021:  1. No pulmonary emboli.  2. No abnormality.           ASSESSMENT/PLAN:  # Recovered cardiomyopathy. Euvolemic.   # Suspected mid LAD SCAD, [facility] coronary CTA 12/6/2021, CMR 2/24/2022 with subendocardial infarct in the corresponding territory  # HTN, uncontrolled at present  # HL, on statin       Overall patient is in stable cardiac health without symptoms concerning for angina or decompensated heart failure.  She is able to engage in activities equivalent to at least 4 METS without abnormal symptoms.    Echo shows normal LVEF.      She may proceed with surgery without further cardiac diagnostic testing, recommend close monitoring of hemodynamics, judicious fluid management given history of recovered cardiomyopathy.     Continue ASA 81 mg.     Continue remainder of cardiac regimen including metoprolol succinate, rosuvastatin.      Increase lisinopril to 30 mg daily.  She has 20 mg tablets and 10 mg tablets.     Advised on activity restrictions, patient does work a relatively physically demanding job, Dr. Edwards provided a work note communicating our recommendation that she not lift more than 25 pounds regularly.  However, she says she has been on unpaid  leave due to the restriction, and wants to go back to work. She says that she can work with her colleagues and will not lift more than 25 lbs on her own. She was given a new letter stating she can go back to work.     She underwent CTA CAP 1/26/24 with no evidence of FMD.    CTA head/neck needs to be scheduled to finish screening for associated FMD.        Follow up with Dr. Edwards in July 2024 as previously ordered.       Ashwin Ye PA-C        Thank you for allowing me to participate in the care of your patient.      Sincerely,     Ashwin Ye PA-C     [facility]  cc:   No referring provider defined for this encounter.

## 2024-02-29 NOTE — PROGRESS NOTES
CARDIOLOGY CLINIC PROGRESS NOTE    DOS: 2024      Angela Villasenor  : 1980, 44 year old  MRN: [id number]      Primary cardiologist: Dr. 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       Patient had been followed closely by [facility] cardiology.  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    TTE 2021 LVEF 40%, akinesis of the mid apical anteroseptal, anterior, inferoseptal wall segments as well as the apex.    Coronary CTA 2021 report was addended 2022, initial report noted no significant abnormalities, on review, there were findings in the mid LAD suggestive of spontaneous coronary artery dissection.    Cardiac MRI 2022 demonstrated findings consistent with small subendocardial infarction involving the mid LAD, LVEF 57%, RVEF 60%, no significant valvular abnormalities.     She met Dr. Edwards 24.     Interval History, reviewed:  Echo 24:  Interpretation Summary   The left ventricle is normal in structure, function and size.  Normal valvular function Technically difficult, suboptimal study.      Patient is planning to undergo bariatric surgery.      Preop is scheduled .     Overall she reports that she has been doing well from a cardiac perspective.  She denies any exertional chest pain, chest pressure, abnormal shortness of breath.  She is able to climb a flight of stairs, and work around her home engaging activities such as vacuuming, heavy cleaning, without abnormal symptoms.  She does note occasional chest wall discomfort, described as a numbness over her left breast, this has been present for several years, nonexertional in nature, relieved with massage over the area.  She works a physically demanding job with [employer]. Dr. Edwards filled out forms with lifting restrictions.   She has been on unpaid leave due to her lifting restrictions. But she would like to go back to work and needs a letter that states she can work without restrictions. She says when they lift they work together, so she will not be lifting more than 25-30 lbs on her own.     She was recently diagnosed with influenza.     BP has been elevated. Currently on lisinopril 20 mg. Normally on 10 mg.     We called  services, but were on hold for half hour.  Eventually, we did call her son, who interpreted for us.  Additionally, she spoke quite a bit of English and had no questions for me at the end.       ROS:  Skin:  not assessed     Eyes:  not assessed    ENT:  not assessed    Respiratory:  Negative    Cardiovascular:  Negative    Gastroenterology: not assessed    Genitourinary:  not assessed    Musculoskeletal:  not assessed    Neurologic:  not assessed    Psychiatric:  not assessed    Heme/Lymph/Imm:  not assessed    Endocrine:  not assessed      PAST MEDICAL HISTORY:  Past Medical History:   Diagnosis Date    Cardiomyopathy (H)     Chronic heart failure with preserved ejection fraction (H) 12/14/2021    DM2 (diabetes mellitus, type 2) (H)     External hemorrhoids     Fatty liver 11/12/2021    Gastroesophageal reflux disease     History of H. pylori infection     Hypertension     Hypertriglyceridemia     Obesity        PAST SURGICAL HISTORY:  Past Surgical History:   Procedure Laterality Date    COLONOSCOPY      CYSTOSCOPY, INJECT BOTOX N/A 1/20/2022    Procedure: EXAM UNDER ANESTHESIA WITH BOTOX INJECTION;  Surgeon: Misti Juan MD;  Location: [facility]       SOCIAL HISTORY:  Social History     Socioeconomic History    Marital status:    Tobacco Use    Smoking status: Never    Smokeless tobacco: Never   Substance and Sexual Activity    Alcohol use: Never    Drug use: Never    Sexual activity: Yes     Partners: Male       FAMILY HISTORY:  No family history on file.    MEDS: aspirin 81 MG EC  "tablet, Take 1 tablet (81 mg) by mouth daily  benzonatate (TESSALON) 200 MG capsule, Take 1 capsule (200 mg) by mouth 3 times daily as needed for cough  cetirizine (ZYRTEC) 10 MG tablet, Take 1 tablet (10 mg) by mouth daily for 14 days  doxycycline monohydrate (ADOXA) 100 MG tablet, Take 1 tablet (100 mg) by mouth 2 times daily for 7 days  esomeprazole (NEXIUM) 20 MG DR capsule,   fluticasone (FLONASE) 50 MCG/ACT nasal spray, Spray 1 spray into both nostrils daily  hydrocortisone, Perianal, (HYDROCORTISONE) 2.5 % cream, Place rectally 2 times daily as needed for hemorrhoids  lactulose encephalopathy (CHRONULAC) 10 GM/15ML SOLUTION, Take by mouth 3 times daily  lisinopril (ZESTRIL) 10 MG tablet, Take 10 mg by mouth  lisinopril (ZESTRIL) 20 MG tablet, Take 1 tablet (20 mg) by mouth daily (Patient taking differently: Take 20 mg by mouth daily Pt is only taking this 20 mg for 1 week due to having influenza A.)  metFORMIN (GLUCOPHAGE XR) 500 MG 24 hr tablet, Take 4 tablets (2,000 mg) by mouth daily (with dinner)  metoprolol succinate ER (TOPROL-XL) 50 MG 24 hr tablet,   ondansetron (ZOFRAN ODT) 4 MG ODT tab, Take 1 tablet (4 mg) by mouth every 8 hours as needed for nausea  polyethylene glycol (MIRALAX) 17 g packet, Take 1 packet by mouth 3 times daily  rosuvastatin (CRESTOR) 10 MG tablet,     No current facility-administered medications on file prior to visit.      ALLERGIES: No Known Allergies    PHYSICAL EXAM:  Vitals: BP (!) 158/82 (BP Location: Right arm, Patient Position: Sitting, Cuff Size: Adult Large)   Pulse 80   Ht 1.626 m (5' 4\")   Wt 103 kg (227 lb)   SpO2 99%   BMI 38.96 kg/m    Constitutional:  cooperative, alert and oriented, well developed, well nourished, in no acute distress        Skin:  warm and dry to the touch, no apparent skin lesions or masses noted        Head:  normocephalic, no masses or lesions        Eyes:  pupils equal and round;conjunctivae and lids unremarkable;sclera white    "     ENT:  no pallor or cyanosis        Neck:           Respiratory:  clear to auscultation        Cardiac: regular rhythm;normal S1 and S2                  GI:    obese      Vascular:                                        Extremities and Musculoskeletal:           Neurological:  no gross motor deficits;affect appropriate              LABS/DATA:  I reviewed the following:  Echo 2/28/24:  Interpretation Summary   The left ventricle is normal in structure, function and size.  Normal valvular function Technically difficult, suboptimal study.      CARDIAC MRI 2/24/2022:  1. Findings are consistent with small subendocardial infarction involving the mid LAD which in retrospect was demonstrated on coronary CTA from 12/2021. Findings on CCTA suggest to me spontaneous coronary artery dissection involving the mid LAD.  - There is discrete myocardial edema involving the apical septum and apical anterior segments with wall  motion abnormality involving the mid anteroseptum and apical septal segment which are hypokinetic.  - Discrete hypoperfusion involving the mid anteroseptum in early-Jean-Pierre enhancement supports LAD involvement.  2. Normal LV size and preserved LVEF=57% but regional wall motion abnormalities. Normal wall thickness.   - No evidence of infiltrative process.  3. Normal RV size and systolic function. RVEF=60%.  4. No significant valvular disease.      CT CORONARY ANGIOGRAM 12/6/2021:  1. No CTA evidence for CAD.  - Ca score zero.  - no plaque or stenosis seen.  - no coronary lesion to account for symptoms.  2. Normal caliber ascending aorta and root.  3. Normal appearing pericardium.  4. Please see separate radiology report for review of non-cardiovascular structures.       TTE 10/28/2021:  1. Normal LV size, mild LV wall thickness, estimated EF 40%.  2. Akinesis involving the mid to apical anteroseptum, anterior and inferoseptum wall segments as well as the apex. No left ventricular thrombus was seen.   3. The RV is  not well-visualized, function appears to be normal.  4. No hemodynamically significant valvular abnormalities.  5. No pericardial effusion.       TTE 9/22/2021:  1. Normal LV size, mild LV wall thickness, estimated EF 40%.  2. Akinesis involving the mid to apical anteroseptum, anterior and inferoseptum wall segments as well as the apex. No left ventricular thrombus was seen.   3. The RV is not well-visualized, function appears to be normal.  4. No hemodynamically significant valvular abnormalities.  5. No pericardial effusion.       CT PULMONARY ANGIOGRAM 8/25/2021:  1. No pulmonary emboli.  2. No abnormality.           ASSESSMENT/PLAN:  # Recovered cardiomyopathy. Euvolemic.   # Suspected mid LAD SCAD, [facility] coronary CTA 12/6/2021, CMR 2/24/2022 with subendocardial infarct in the corresponding territory  # HTN, uncontrolled at present  # HL, on statin       Overall patient is in stable cardiac health without symptoms concerning for angina or decompensated heart failure.  She is able to engage in activities equivalent to at least 4 METS without abnormal symptoms.    Echo shows normal LVEF.      She may proceed with surgery without further cardiac diagnostic testing, recommend close monitoring of hemodynamics, judicious fluid management given history of recovered cardiomyopathy.     Continue ASA 81 mg.     Continue remainder of cardiac regimen including metoprolol succinate, rosuvastatin.      Increase lisinopril to 30 mg daily.  She has 20 mg tablets and 10 mg tablets.     Advised on activity restrictions, patient does work a relatively physically demanding job, Dr. Edwards provided a work note communicating our recommendation that she not lift more than 25 pounds regularly.  However, she says she has been on unpaid leave due to the restriction, and wants to go back to work. She says that she can work with her colleagues and will not lift more than 25 lbs on her own. She was given a new letter stating she can go back to  work.     She underwent CTA CAP 1/26/24 with no evidence of FMD.    CTA head/neck needs to be scheduled to finish screening for associated FMD.        Follow up with Dr. Edwards in July 2024 as previously ordered.       Ashwin Ye PA-C

## 2024-02-29 NOTE — PATIENT INSTRUCTIONS
The echo looks good.  The pumping of your heart is normal.     Your blood pressure is high.  Increase lisinopril to take 30 mg.     See Dr. Edwards in 6 months.

## 2024-03-02 ENCOUNTER — HOSPITAL ENCOUNTER (OUTPATIENT)
Dept: CT IMAGING | Facility: CLINIC | Age: 44
Discharge: HOME | End: 2024-03-02
Attending: INTERNAL MEDICINE | Admitting: INTERNAL MEDICINE
Payer: COMMERCIAL

## 2024-03-02 DIAGNOSIS — I77.3 FIBROMUSCULAR DYSPLASIA (H): ICD-10-CM

## 2024-03-02 DIAGNOSIS — I25.42 SPONTANEOUS DISSECTION OF CORONARY ARTERY: ICD-10-CM

## 2024-03-02 LAB
CREAT BLD-MCNC: 0.6 MG/DL (ref 0.5–1)
EGFRCR SERPLBLD CKD-EPI 2021: >60 ML/MIN/1.73M2

## 2024-03-02 PROCEDURE — 82565 ASSAY OF CREATININE: CPT

## 2024-03-02 PROCEDURE — 70496 CT ANGIOGRAPHY HEAD: CPT

## 2024-03-02 PROCEDURE — 250N000011 HC RX IP 250 OP 636: Performed by: INTERNAL MEDICINE

## 2024-03-02 PROCEDURE — 250N000009 HC RX 250: Performed by: INTERNAL MEDICINE

## 2024-03-02 RX ORDER — IOPAMIDOL 755 MG/ML
67 INJECTION, SOLUTION INTRAVASCULAR ONCE
Status: COMPLETED | OUTPATIENT
Start: 2024-03-02 | End: 2024-03-02

## 2024-03-02 RX ADMIN — IOPAMIDOL 67 ML: 755 INJECTION, SOLUTION INTRAVENOUS at 09:58

## 2024-03-02 RX ADMIN — SODIUM CHLORIDE 80 ML: 9 INJECTION, SOLUTION INTRAVENOUS at 09:59

## 2024-03-05 ENCOUNTER — TELEPHONE (OUTPATIENT)
Dept: CARDIOLOGY | Facility: CLINIC | Age: 44
End: 2024-03-05
Payer: COMMERCIAL

## 2024-03-05 ENCOUNTER — APPOINTMENT (OUTPATIENT)
Dept: INTERPRETER SERVICES | Facility: CLINIC | Age: 44
End: 2024-03-05
Payer: COMMERCIAL

## 2024-03-05 NOTE — RESULT ENCOUNTER NOTE
Results reviewed, please let the patient know that overall findings are reassuring, normal neck and head CTA.   Follow up as previously planned, thanks!

## 2024-03-05 NOTE — TELEPHONE ENCOUNTER
Message from Dr. Edwards re: CT  Christiano Edwards MD  [facility] Heart Team 2  Results reviewed, please let the patient know that overall findings are reassuring, normal neck and head CTA.  Follow up as previously planned    Patient to see Dr. Edwards on 8/5/2024    Spoke with patient through [employer]  to review her CT results as stable.
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
## 2024-03-28 DIAGNOSIS — I10 UNCONTROLLED HYPERTENSION: ICD-10-CM

## 2024-03-29 RX ORDER — LISINOPRIL 20 MG/1
30 TABLET ORAL DAILY
Qty: 30 TABLET | Refills: 0 | Status: SHIPPED | OUTPATIENT
Start: 2024-03-29 | End: 2024-08-30

## 2024-04-01 ENCOUNTER — OFFICE VISIT (OUTPATIENT)
Dept: SURGERY | Facility: CLINIC | Age: 44
End: 2024-04-01
Payer: COMMERCIAL

## 2024-04-01 ENCOUNTER — VIRTUAL VISIT (OUTPATIENT)
Dept: INTERPRETER SERVICES | Facility: CLINIC | Age: 44
End: 2024-04-01

## 2024-04-01 VITALS
SYSTOLIC BLOOD PRESSURE: 138 MMHG | BODY MASS INDEX: 38.93 KG/M2 | OXYGEN SATURATION: 99 % | WEIGHT: 228 LBS | HEART RATE: 67 BPM | HEIGHT: 64 IN | DIASTOLIC BLOOD PRESSURE: 86 MMHG

## 2024-04-01 DIAGNOSIS — I50.32 CHRONIC HEART FAILURE WITH PRESERVED EJECTION FRACTION (H): ICD-10-CM

## 2024-04-01 DIAGNOSIS — E65 ABDOMINAL PANNUS: ICD-10-CM

## 2024-04-01 DIAGNOSIS — T14.90XA TRAUMA: ICD-10-CM

## 2024-04-01 DIAGNOSIS — K21.9 GASTROESOPHAGEAL REFLUX DISEASE, UNSPECIFIED WHETHER ESOPHAGITIS PRESENT: ICD-10-CM

## 2024-04-01 DIAGNOSIS — E66.01 MORBID OBESITY (H): Primary | ICD-10-CM

## 2024-04-01 DIAGNOSIS — E11.9 TYPE 2 DIABETES MELLITUS WITHOUT COMPLICATION, WITHOUT LONG-TERM CURRENT USE OF INSULIN (H): Chronic | ICD-10-CM

## 2024-04-01 DIAGNOSIS — K76.0 FATTY LIVER: ICD-10-CM

## 2024-04-01 PROCEDURE — 99215 OFFICE O/P EST HI 40 MIN: CPT | Performed by: PHYSICIAN ASSISTANT

## 2024-04-01 PROCEDURE — T1013 SIGN LANG/ORAL INTERPRETER: HCPCS | Mod: U4

## 2024-04-01 RX ORDER — SEMAGLUTIDE 1 MG/.5ML
1 INJECTION, SOLUTION SUBCUTANEOUS WEEKLY
Qty: 2 ML | Refills: 0 | Status: SHIPPED | OUTPATIENT
Start: 2024-04-01

## 2024-04-01 RX ORDER — SEMAGLUTIDE 0.25 MG/.5ML
0.25 INJECTION, SOLUTION SUBCUTANEOUS WEEKLY
Qty: 2 ML | Refills: 0 | Status: SHIPPED | OUTPATIENT
Start: 2024-04-01

## 2024-04-01 NOTE — ASSESSMENT & PLAN NOTE
We discussed patient's candidacy for bariatric surgery at this time and you are in light of Dr. Keating's evaluation.  She will establish care with a Syrian therapist or 1 sensitivity to some only culture.  She will continue to TC myself for medication management and bariatric education as well as the dietitian.      We discussed restarting Ozempic slowly and using Zofran as needed.  She will increase her omeprazole to twice daily.  She will continue her metformin.      Abdominal binder was ordered today however her insurance does not cover it so she was measured and will get 1 off of [employer].

## 2024-04-01 NOTE — ASSESSMENT & PLAN NOTE
Abdominal binder was ordered today however her insurance does not cover it so she was measured and will get 1 off of [employer].

## 2024-04-01 NOTE — PATIENT INSTRUCTIONS
Nice to talk with you today. Below is the plan discussed.-  AMPARO Garza      Pt Instructions:  Continue metformin    Restart Ozempic.  0.25 mg weekly     Start Zofran 4 mg ODT every 4 hours as needed for nausea.    Get abdominal binder on [employer].       Goals:  Establish care with [place]une[place] therapist. Once they feels you are stable, your can see Dr. Lao to get clearance to restart the  bariatric surgery process.     See dietician and continue every 6 weeks.       Follow up:    Call [phone] to schedule next visit in July for a med check.

## 2024-04-01 NOTE — PROGRESS NOTES
Pt seen per provide request to recap visit with .  Pt to start on 0.25 Wegovy  Pt to increase to Nexium BID - was on 20 mg per Epic but pt states is 40 mg. Would like to know if should increase to BID.        Msg sent to provider to clarify.  3. Given number for [facility] and reminded to call and        request [place]ian provider.  4.  Once is cleared by psych can proceed with surgery.  5. In the meantime, see diet as soon as can and continue to see       provider.  6. Provided info on ordering belly binder as insurance won't pay.       Pt can get at  [employer] or [employer]. Pt was measured for binder       and given measurement.

## 2024-04-02 ENCOUNTER — MYC MEDICAL ADVICE (OUTPATIENT)
Dept: SURGERY | Facility: CLINIC | Age: 44
End: 2024-04-02
Payer: COMMERCIAL

## 2024-04-03 ENCOUNTER — TELEPHONE (OUTPATIENT)
Dept: SURGERY | Facility: CLINIC | Age: 44
End: 2024-04-03

## 2024-04-03 NOTE — TELEPHONE ENCOUNTER
EPA approved:   Approved   4/2/2024 11:28 AM  Case ID: [id number]    Payer: [employer]    [phone]     Approval Details    Authorization number: [id number]   Authorized from April 2, 2024 to October 2, 2024   Information received electronically from payer

## 2024-04-04 NOTE — TELEPHONE ENCOUNTER
Noted pt has not read either [facility] msg from 4/2/24.  Contacted  [phone].  Pt was NA.  Requested pt/family member read both  msgs from 4/2/24 and to contact clinic if questions - number left.  Nevaeh Tobar, MS, RD, RN

## 2024-04-22 ENCOUNTER — VIRTUAL VISIT (OUTPATIENT)
Dept: SURGERY | Facility: CLINIC | Age: 44
End: 2024-04-22
Payer: COMMERCIAL

## 2024-04-22 VITALS — BODY MASS INDEX: 38.93 KG/M2 | WEIGHT: 228 LBS | HEIGHT: 64 IN

## 2024-04-22 DIAGNOSIS — E66.01 MORBID OBESITY (H): Primary | ICD-10-CM

## 2024-04-22 PROCEDURE — 97803 MED NUTRITION INDIV SUBSEQ: CPT | Mod: 95

## 2024-04-22 NOTE — PROGRESS NOTES
"Angela is a 44 year old who is being evaluated via a billable video visit.      The patient has been notified of following:     \"This video visit will be conducted via a call between you and your physician/provider. We have found that certain health care needs can be provided without the need for an in-person physical exam.  This service lets us provide the care you need with a video conversation.  If a prescription is necessary we can send it directly to your pharmacy.  If lab work is needed we can place an order for that and you can then stop by our lab to have the test done at a later time.    Video visits are billed at different rates depending on your insurance coverage.  Please reach out to your insurance provider with any questions.    If during the course of the call the physician/provider feels a video visit is not appropriate, you will not be charged for this service.\"    Patient has given verbal consent for Video visit? Yes    How would you like to obtain your AVS? MyChart    If the video visit is dropped, the invitation should be resent by: Text to cell phone: [phone]    Will anyone else be joining your video visit? No    I    Video-Visit Details    Type of service:  Video Visit    Originating Location (pt. Location): Home    Distant Location (provider location):   Off-Site - Provider Home Office    Platform used for Video Visit: Billdesk    Video Start Time:  1:00pm    Video End Time: 1:29pm    MEDICAL WEIGHT LOSS FOLLOW UP    Reason for visit: medical weight loss     DIAGNOSIS:  Class II Obesity    ANTHROPOMETRICS:  Initial Weight: 228.2 pounds  Current Weight:  228 lbs 0 oz   BMI: 39.14 kg/m2    Weight Loss Medications:   Wegovy     NUTRITION HISTORY:  Breakfast: skips  Lunch:  Malian pancake  Dinner:  rice or pasta +/- salad  Snacks: apple  Beverage choices: water, juice        Exercise:  Type: treadmill  Duration: 20-30 minutes  Frequency: 2-3x/week  *Also has active job at [employer]; " walking majority of 5h shift    Additional Information: Pt seen for initial surgical assessment in December but has been lost to RD follow up since then. Is not appropriate for surgery  at this time per psych notes. Pt tolerating Wegovy. Discussed increasing protein and fiber. Recommended eat 3 meals/day. Appropriate questions about healthy oils and beverages. Will send [employer] MyPlate info from     EVALUATION/PROGRESS TOWARDS GOALS:  Previous Goals:   Avoid liquids with meals - not needed  Chew foods to applesauce texture - not needed  Start complete multivitamin and mineral; 600 mg calcium with vitamin D 2 times per day; 3000 international unit(s) vitamin D - not needed       Previous Nutrition Diagnosis:  Obese class II related to excess energy intake and self-monitoring deficit as evidenced by BMI of 39.17 kg/m2.  No change, modified below     Current Nutrition Diagnosis:   Obese class II related to excess energy intake and self-monitoring deficit as evidenced by BMI of 39.14 kg/m2.    INTERVENTION:    Nutrition Prescription:  Recommend modified nutrient intake by decreasing energy intake.    Implementation:    Nutrition Education (Content):  Discussed previous goals and determined new goals  Encourage physical activity  Supported patient in attempted weight loss and behavior changes  Patient verbalizes understanding of diet by stating she will increase protein  Anticipate fair-good compliance    Goals:  Eat 3 meals/day  Include protein with all meals  Increase fruit/vegetable intake    Follow Up/Monitoring:  Other  -  patient to follow up in 4-8 weeks    Time Spent With Patient:  29 Minutes       Mimi Galvin RD, LD  Clinical Dietitian

## 2024-04-22 NOTE — PATIENT INSTRUCTIONS
"Rojas Miller!      It was great meeting with you today! Here are some links to the handouts I referenced:        My Plate:  MyPlate, [place]ican  Meal Planning, [place]  https://www.choosemyplate.gov/    Healthy Oils:  Healthy Oils, [place]    Protein Sources:  http://Sanako/588460.pdf     Fiber Sources:  http://Sanako/[email or web address]             A helpful search term to type into [employer], [employer], etc is \"myplate examples.\" [Link: MyPlate examples Google Search ]       Here is a summary of the goals that we discussed:     1. Eat 3 meals/day  - Have your first meal within 1 hour of waking up, then eat every 4-6 hours  - This helps support metabolism, control blood sugar, and curb cravings     2. Include protein at all meals  - (see list above)  - meat, dairy, beans/lentils, nuts/seeds, etc  - Limit red meat to 2x/week     3. Increase fiber  - Add more fruits and vegetables to your diet    4. Healthy Beverages:  - water (flat or sparkling/fizzy)  - flavored water (crystal light, clinton, etc)  - sugar-free sports drinks (Gatorade Zero Powerade Zero, Propel, etc)  - decaf/herbal tea       Let's plan on following up in 1-2 months. This can be scheduled via our call center at . Of course, reach out sooner if you have any questions or concerns. Have a great week!        Mimi Galvin RD, LD  Clinical Dietitian     "

## 2024-04-27 DIAGNOSIS — R09.81 CONGESTION OF PARANASAL SINUS: ICD-10-CM

## 2024-04-29 RX ORDER — FLUTICASONE PROPIONATE 50 MCG
1 SPRAY, SUSPENSION (ML) NASAL DAILY
Qty: 16 G | Refills: 0 | OUTPATIENT
Start: 2024-04-29

## 2024-05-05 ENCOUNTER — HEALTH MAINTENANCE LETTER (OUTPATIENT)
Age: 44
End: 2024-05-05

## 2024-06-03 ENCOUNTER — TELEPHONE (OUTPATIENT)
Dept: SURGERY | Facility: CLINIC | Age: 44
End: 2024-06-03

## 2024-06-03 NOTE — TELEPHONE ENCOUNTER
Identified pt with two pt identifiers(name and ). Reviewed record in preparation for visit and have obtained necessary documentation. Chief Complaint   Patient presents with    Follow-up        Health Maintenance Due   Topic    DTaP/Tdap/Td series (1 - Tdap)    Shingrix Vaccine Age 50> (1 of 2)    GLAUCOMA SCREENING Q2Y     Pneumococcal 65+ years (1 of 1 - PPSV23)    Flu Vaccine (1)       Visit Vitals  Blood Pressure (Abnormal) 154/77 (BP 1 Location: Left arm, BP Patient Position: Sitting)   Pulse 74   Temperature 97.8 °F (36.6 °C) (Oral)   Respiration 16   Height 5' 5\" (1.651 m)   Weight 115 lb (52.2 kg)   Oxygen Saturation 100%   Body Mass Index 19.14 kg/m²         Coordination of Care Questionnaire:  :   1) Have you been to an emergency room, urgent care, or hospitalized since your last visit? NO      2. Have seen or consulted any other health care provider since your last visit? NO      Patient is accompanied by  I have received verbal consent from Starlyn Lesches to discuss any/all medical information while they are present in the room. Pt called in about upcoming cancelled appts. Pt is requesting a new referral for the [facility] appts. The previous can only be used for a therapy referral, which we did schedule pt for on 6/13/24 with Jacqueline Perez at [facility]. Thank you!    [facility] Outpatient Intake    [phone]

## 2024-06-24 ENCOUNTER — ANCILLARY PROCEDURE (OUTPATIENT)
Dept: GENERAL RADIOLOGY | Facility: CLINIC | Age: 44
End: 2024-06-24
Attending: PHYSICIAN ASSISTANT
Payer: COMMERCIAL

## 2024-06-24 ENCOUNTER — VIRTUAL VISIT (OUTPATIENT)
Dept: INTERPRETER SERVICES | Facility: CLINIC | Age: 44
End: 2024-06-24

## 2024-06-24 ENCOUNTER — OFFICE VISIT (OUTPATIENT)
Dept: URGENT CARE | Facility: URGENT CARE | Age: 44
End: 2024-06-24
Payer: COMMERCIAL

## 2024-06-24 VITALS
OXYGEN SATURATION: 100 % | HEART RATE: 76 BPM | SYSTOLIC BLOOD PRESSURE: 129 MMHG | DIASTOLIC BLOOD PRESSURE: 80 MMHG | TEMPERATURE: 98.9 F | RESPIRATION RATE: 18 BRPM

## 2024-06-24 DIAGNOSIS — J06.9 UPPER RESPIRATORY TRACT INFECTION, UNSPECIFIED TYPE: ICD-10-CM

## 2024-06-24 DIAGNOSIS — J06.9 UPPER RESPIRATORY TRACT INFECTION, UNSPECIFIED TYPE: Primary | ICD-10-CM

## 2024-06-24 LAB
DEPRECATED S PYO AG THROAT QL EIA: NEGATIVE
FLUAV AG SPEC QL IA: NEGATIVE
FLUBV AG SPEC QL IA: NEGATIVE
GROUP A STREP BY PCR: NOT DETECTED

## 2024-06-24 PROCEDURE — 99214 OFFICE O/P EST MOD 30 MIN: CPT | Performed by: PHYSICIAN ASSISTANT

## 2024-06-24 PROCEDURE — 87651 STREP A DNA AMP PROBE: CPT | Performed by: PHYSICIAN ASSISTANT

## 2024-06-24 PROCEDURE — T1013 SIGN LANG/ORAL INTERPRETER: HCPCS | Mod: U4

## 2024-06-24 PROCEDURE — 87804 INFLUENZA ASSAY W/OPTIC: CPT | Performed by: PHYSICIAN ASSISTANT

## 2024-06-24 PROCEDURE — 87635 SARS-COV-2 COVID-19 AMP PRB: CPT | Performed by: PHYSICIAN ASSISTANT

## 2024-06-24 PROCEDURE — 71046 X-RAY EXAM CHEST 2 VIEWS: CPT | Mod: TC | Performed by: RADIOLOGY

## 2024-06-24 RX ORDER — FLUTICASONE PROPIONATE 50 MCG
1 SPRAY, SUSPENSION (ML) NASAL DAILY
Qty: 15.8 ML | Refills: 0 | Status: SHIPPED | OUTPATIENT
Start: 2024-06-24

## 2024-06-24 RX ORDER — GUAIFENESIN 600 MG/1
1200 TABLET, EXTENDED RELEASE ORAL 2 TIMES DAILY
Qty: 28 TABLET | Refills: 0 | Status: SHIPPED | OUTPATIENT
Start: 2024-06-24 | End: 2024-07-01

## 2024-06-24 RX ORDER — BENZONATATE 100 MG/1
100 CAPSULE ORAL 3 TIMES DAILY PRN
Qty: 30 CAPSULE | Refills: 0 | Status: SHIPPED | OUTPATIENT
Start: 2024-06-24

## 2024-06-24 NOTE — PROGRESS NOTES
SUBJECTIVE:   Angela Villasenor is a 44 year old female presenting with a chief complaint of fever, chills, runny nose, stuffy nose, cough - non-productive, cough - productive, shortness of breath, sore throat, facial pain/pressure, headache, body aches, and fatigue.  Onset of symptoms was 3 day(s) ago.  Course of illness is same.    Severity moderate  Current and Associated symptoms: as above  Treatment measures tried include Fluids and Rest.  Predisposing factors include similar symptoms 2 weeks ago.    Past Medical History:   Diagnosis Date    Cardiomyopathy (H)     Chronic heart failure with preserved ejection fraction (H) 12/14/2021    DM2 (diabetes mellitus, type 2) (H)     External hemorrhoids     Fatty liver 11/12/2021    Gastroesophageal reflux disease     History of H. pylori infection     Hypertension     Hypertriglyceridemia     Obesity      Current Outpatient Medications   Medication Sig Dispense Refill    aspirin 81 MG EC tablet Take 1 tablet (81 mg) by mouth daily 90 tablet 3    esomeprazole (NEXIUM) 20 MG DR capsule       fluticasone (FLONASE) 50 MCG/ACT nasal spray Spray 1 spray into both nostrils daily 16 g 0    hydrocortisone, Perianal, (HYDROCORTISONE) 2.5 % cream Place rectally 2 times daily as needed for hemorrhoids 30 g 0    lactulose encephalopathy (CHRONULAC) 10 GM/15ML SOLUTION Take by mouth 3 times daily      lisinopril (ZESTRIL) 10 MG tablet Take 10 mg by mouth      metFORMIN (GLUCOPHAGE XR) 500 MG 24 hr tablet Take 4 tablets (2,000 mg) by mouth daily (with dinner) 120 tablet 3    metoprolol succinate ER (TOPROL-XL) 50 MG 24 hr tablet       polyethylene glycol (MIRALAX) 17 g packet Take 1 packet by mouth 3 times daily      rosuvastatin (CRESTOR) 10 MG tablet       Semaglutide-Weight Management (WEGOVY) 1 MG/0.5ML pen Inject 1 mg Subcutaneous once a week 2 mL 0    lisinopril (ZESTRIL) 20 MG tablet Take 1.5 tablets (30 mg) by mouth daily (Patient not taking: Reported on 4/1/2024) 30 tablet 0     ondansetron (ZOFRAN ODT) 4 MG ODT tab Take 1 tablet (4 mg) by mouth every 8 hours as needed for nausea (Patient not taking: Reported on 4/1/2024) 15 tablet 0    Semaglutide-Weight Management (WEGOVY) 0.25 MG/0.5ML pen Inject 0.25 mg Subcutaneous once a week (Patient not taking: Reported on 6/24/2024) 2 mL 0    Semaglutide-Weight Management (WEGOVY) 0.5 MG/0.5ML pen Inject 0.5 mg Subcutaneous once a week (Patient not taking: Reported on 6/24/2024) 2 mL 0     Social History     Tobacco Use    Smoking status: Never    Smokeless tobacco: Never   Substance Use Topics    Alcohol use: Never       ROS:  Review of systems negative except as stated above.    OBJECTIVE:  /80   Pulse 76   Temp 98.9  F (37.2  C)   Resp 18   LMP  (LMP Unknown)   SpO2 100%   GENERAL APPEARANCE: healthy, alert and no distress  EYES: EOMI,  PERRL, conjunctiva clear  HENT: ear canals and TM's normal.  Nose and mouth without ulcers, erythema or lesions  NECK: supple, nontender, no lymphadenopathy  RESP: lungs clear to auscultation - no rales, rhonchi or wheezes  CV: regular rates and rhythm, normal S1 S2, no murmur noted  NEURO: Normal strength and tone, sensory exam grossly normal,  normal speech and mentation  SKIN: no suspicious lesions or rashes      Results for orders placed or performed in visit on 06/24/24   Streptococcus A Rapid Screen w/Reflex to PCR - Clinic Collect     Status: Normal    Specimen: Throat; Swab   Result Value Ref [place]    Group A Strep antigen Negative Negative   Influenza A & B Antigen - Clinic Collect     Status: Normal    Specimen: Nose; Swab   Result Value Ref [place]    Influenza A antigen Negative Negative    Influenza B antigen Negative Negative    Narrative    Test results must be correlated with clinical data. If necessary, results should be confirmed by a molecular assay or viral culture.       ASSESSMENT:  (J06.9) Upper respiratory tract infection, unspecified type  (primary encounter diagnosis)  Plan:  Symptomatic COVID-19 Virus (Coronavirus) by         PCR, Streptococcus A Rapid Screen w/Reflex to         PCR - Clinic Collect, Influenza A & B Antigen -        Clinic Collect, XR Chest 2 Views, Group A         Streptococcus PCR Throat Swab, fluticasone         (FLONASE) 50 MCG/ACT nasal spray, guaiFENesin         (MUCINEX) 600 MG 12 hr tablet, benzonatate         (TESSALON) 100 MG capsule      Red flags and emergent follow up discussed, and understood by patient  Follow up with PCP if symptoms worsen or fail to improve

## 2024-06-24 NOTE — LETTER
June 24, 2024      Angela Villasenor  [address] [address]        To Whom It May Concern:    Angela Villasenor  was seen on 6/24.  Please excuse her until 6/27 due to illness.        Sincerely,        Rustam Pruett PA-C

## 2024-06-25 ENCOUNTER — TELEPHONE (OUTPATIENT)
Dept: NURSING | Facility: CLINIC | Age: 44
End: 2024-06-25
Payer: COMMERCIAL

## 2024-06-25 ENCOUNTER — NURSE TRIAGE (OUTPATIENT)
Dept: PEDIATRICS | Facility: CLINIC | Age: 44
End: 2024-06-25
Payer: COMMERCIAL

## 2024-06-25 LAB — SARS-COV-2 RNA RESP QL NAA+PROBE: POSITIVE

## 2024-06-25 NOTE — TELEPHONE ENCOUNTER
Coronavirus (COVID-19) Notification    Caller Name (Patient, parent, daughter/son, grandparent, etc)  Patient    Reason for call  Notify of Positive Coronavirus (COVID-19) lab results, assess symptoms,  review [facility] recommendations    Lab Result    Lab test:  2019-nCoV rRt-PCR or SARS-CoV-2 PCR    Oropharyngeal AND/OR nasopharyngeal swabs is POSITIVE for 2019-nCoV RNA/SARS-COV-2 PCR (COVID-19 virus)      Gather patient reported symptoms   Assessment   Current Symptoms at time of phone call, reported by patient: (if no symptoms, document: No symptoms] No symptoms   Date of symptom(s) onset (if applicable)      If at time of call, Patients symptoms have worsened, the Patient should contact 911 or have someone drive them to Emergency Dept promptly:    If Patient calling 911, inform 911 personal that you have tested positive for the Coronavirus (COVID-19).  Place mask on and await 911 to arrive.  If Emergency Dept, If possible, please have another adult drive you to the Emergency Dept but you need to wear mask when in contact with other people.      Treatment Options:   Is patient interested in discussing COVID treatment? Yes.   Is this a [place] or [place] Patient? No:     Are you an agent trained to scheduling? Yes.        Review information with Patient    Your result was positive. This means you have COVID-19 (coronavirus).    How can I protect others?    These guidelines are for isolating before returning to work, school or .    If you DO have symptoms  Stay home and away from others   For at least 5 days after your symptoms started, AND  You are fever free for 24 hours (with no medicine that reduces fever), AND  Your other symptoms are better  Wear a mask for 10 full days anytime you are around others    If you DON'T have symptoms  Stay home and away from others for at least 5 days after your positive test  Wear a mask for 10 full days anytime you are around others    There may be different  guidelines for healthcare facilities.  Please check with the specific sites before arriving.    If you have been told by a doctor that you were severely ill with COVID-19 or are immunocompromised, you should isolate for at least 10 days.    You should not go back to work until you meet the guidelines above for ending your home isolation. You don't need to be retested for COVID-19 before going back to work--studies show that you won't spread the virus if it's been at least 10 days since your symptoms started (or 20 days, if you have a weak immune system).    Employers, schools, and daycares: This is an official notice for this person's medical guidelines for returning in-person.  They must meet the above guidelines before going back to work, school or  in person.    You will receive a positive COVID-19 letter via [employer] or the mail soon with additional self-care information.    Would you like me to review some of that information with you now?  No    If you were tested for an upcoming procedure, please contact your provider for next steps.    Cayla Baxter

## 2024-06-25 NOTE — TELEPHONE ENCOUNTER
Patient calling in with [employer] .     Patient states she is interested in paxlovid- symptoms started Friday.     S-(situation): covid positive    B-(background): symptoms started on 6/21/24    A-(assessment): Patient has headache, sore throat, mild cough.States she feels much better and is improving.     Denies fevers, muscle aches, chest pain, difficulty breathing, wheezing    R-(recommendations): RN recommended patient contact [facility] to discuss paxlovid. Educated on current CDC guidelines.     Patient was given an opportunity to ask questions, verbalized understanding of plan, and is agreeable.              RN COVID TREATMENT VISIT  06/25/24      The patient has been triaged and does not require a higher level of care.    Angela Villasenor  44 year old  Current weight?     Has the patient been seen by a primary care provider at an [facility] or [facility] within the past two years? No, therefore patient is not eligible for COVID treatment standing order. Patient informed a virtual visit with a provider will be required for treatment. Patient will be scheduled or transferred to a  at the end of this call.   Viji Shah RN            Reason for Disposition   COVID-19 diagnosed by positive lab test (e.g., PCR, rapid self-test kit) and mild symptoms (e.g., cough, fever, others) and no complications or SOB    Additional Information   Negative: SEVERE difficulty breathing (e.g., struggling for each breath, speaks in single words)   Negative: Difficult to awaken or acting confused (e.g., disoriented, slurred speech)   Negative: Bluish (or gray) lips or face now   Negative: Shock suspected (e.g., cold/pale/clammy skin, too weak to stand, low BP, rapid pulse)   Negative: Sounds like a life-threatening emergency to the triager   Negative: Diagnosed or suspected COVID-19 and symptoms lasting 3 or more weeks   Negative: COVID-19 exposure and no symptoms    Negative: COVID-19 vaccine reaction suspected (e.g., fever, headache, muscle aches) occurring 1 to 3 days after getting vaccine   Negative: COVID-19 vaccine, questions about   Negative: Lives with someone known to have influenza (flu test positive) and flu-like symptoms (e.g., cough, runny nose, sore throat, SOB; with or without fever)   Negative: Possible COVID-19 symptoms and triager concerned about severity of symptoms or other causes   Negative: COVID-19 and breastfeeding, questions about   Negative: SEVERE or constant chest pain or pressure  (Exception: Mild central chest pain, present only when coughing.)   Negative: MODERATE difficulty breathing (e.g., speaks in phrases, SOB even at rest, pulse 100-120)   Negative: Headache and stiff neck (can't touch chin to chest)   Negative: Oxygen level (e.g., pulse oximetry) 90% or lower   Negative: Chest pain or pressure  (Exception: MILD central chest pain, present only when coughing.)   Negative: Drinking very little and dehydration suspected (e.g., no urine > 12 hours, very dry mouth, very lightheaded)   Negative: Patient sounds very sick or weak to the triager   Negative: MILD difficulty breathing (e.g., minimal/no SOB at rest, SOB with walking, pulse <100)   Negative: Fever > 103 F (39.4 C)   Negative: Fever > 101 F (38.3 C) and over 60 years of age   Negative: Fever > 100.0 F (37.8 C) and bedridden (e.g., CVA, chronic illness, recovering from surgery)   Negative: HIGH RISK patient (e.g., weak immune system, age > 64 years, obesity with BMI of 30 or higher, pregnant, chronic lung disease or other chronic medical condition) and COVID symptoms (e.g., cough, fever)  (Exceptions: Already seen by doctor or NP/PA and no new or worsening symptoms.)   Negative: HIGH RISK patient and influenza is widespread in the community and ONE OR MORE respiratory symptoms: cough, sore throat, runny or stuffy nose   Negative: HIGH RISK patient and influenza exposure within the last 7  days and ONE OR MORE respiratory symptoms: cough, sore throat, runny or stuffy nose   Negative: Oxygen level (e.g., pulse oximetry) 91 to 94%   Negative: COVID-19 infection suspected by caller or triager and mild symptoms (cough, fever, or others) and negative COVID-19 rapid test   Negative: Fever present > 3 days (72 hours)   Negative: Fever returns after gone for over 24 hours and symptoms worse or not improved   Negative: Continuous (nonstop) coughing interferes with work or school and no improvement using cough treatment per Care Advice   Negative: Cough present > 3 weeks    Protocols used: Coronavirus (COVID-19) Diagnosed or [id number]

## 2024-08-18 DIAGNOSIS — J06.9 UPPER RESPIRATORY TRACT INFECTION, UNSPECIFIED TYPE: ICD-10-CM

## 2024-08-19 RX ORDER — FLUTICASONE PROPIONATE 50 MCG
1 SPRAY, SUSPENSION (ML) NASAL DAILY
Qty: 16 G | OUTPATIENT
Start: 2024-08-19

## 2024-08-30 ENCOUNTER — OFFICE VISIT (OUTPATIENT)
Dept: CARDIOLOGY | Facility: CLINIC | Age: 44
End: 2024-08-30
Attending: INTERNAL MEDICINE
Payer: COMMERCIAL

## 2024-08-30 VITALS
DIASTOLIC BLOOD PRESSURE: 92 MMHG | SYSTOLIC BLOOD PRESSURE: 143 MMHG | HEIGHT: 64 IN | HEART RATE: 75 BPM | OXYGEN SATURATION: 100 % | BODY MASS INDEX: 38.93 KG/M2 | WEIGHT: 228 LBS

## 2024-08-30 DIAGNOSIS — I25.42 SPONTANEOUS DISSECTION OF CORONARY ARTERY: ICD-10-CM

## 2024-08-30 DIAGNOSIS — I50.32 CHRONIC HEART FAILURE WITH PRESERVED EJECTION FRACTION (H): ICD-10-CM

## 2024-08-30 PROCEDURE — 99214 OFFICE O/P EST MOD 30 MIN: CPT | Performed by: INTERNAL MEDICINE

## 2024-08-30 NOTE — PROGRESS NOTES
Cardiology Clinic Progress Note:    August 30, 2024   Patient Name: Angela Villasenor  Patient MRN: [id number]     Consult indication: recovered cardiomyopathy     HPI:    I had the opportunity to see patient Angela Villasenor in cardiology clinic for a follow up visit.     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      Encounter done with a licensed [facility] .       Patient had been followed closely by [facility] cardiology.  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  TTE 9/2021 LVEF 40%, akinesis of the mid apical anteroseptal, anterior, inferoseptal wall segments as well as the apex.  Coronary CTA 12/6/2021 report was addended 2/28/2022, initial report noted no significant abnormalities, on review, there were findings in the mid LAD suggestive of spontaneous coronary artery dissection.  Cardiac MRI 2/24/2022 demonstrated findings consistent with small subendocardial infarction involving the mid LAD, LVEF 57%, RVEF 60%, no significant valvular abnormalities.    I had seen patient in clinic 1/17/2024 for preoperative risk assessment prior to bariatric surgery.  At that time we also reviewed her prior cardiac diagnostic testing, unfortunately they were not familiar with this diagnosis of SCAD.  We had discussed this in detail, patient was assessed with follow-up CTA head to thighs, which was unrevealing of findings concerning for FMD.  It did demonstrate an incidental finding for which she followed up with her PCP, and was referred to gynecologic surgery.  We had discussed activity restrictions.  She was last seen in clinic with my colleague Ashwin SALVADOR, weight lifting/activity restrictions were reviewed again, she is able to work with assistance from a coworker for heavy lifting.    Patient  continues to do well, denies any exertional chest pain, chest pressure, abnormal shortness of breath.  BP today in clinic was mildly elevated at 143/92 mmHg, however she did not take lisinopril yesterday.  She is currently on lisinopril 10 mg daily.    Assessment and Plan/Recommendations:    # Recovered cardiomyopathy. Euvolemic.   # Suspected mid LAD SCAD, [facility] coronary CTA 12/6/2021, CMR 2/24/2022 with subendocardial infarct in the corresponding territory  # HTN, stable  # HL, on statin      - Overall patient is in stable cardiac health without symptoms concerning for angina or decompensated heart failure.  - Continue current cardiac regimen  - Activity restrictions reviewed  - Advised patient to monitor blood pressures at home, she is currently on lisinopril 10 mg daily, this can be uptitrated as needed to achieve goal blood pressure less than 130/80 mmHg  - Follow-up in cardiology clinic as needed    Thank you for allowing our team to participate in the care of Angela Villasenor.  Please do not hesitate to call or page me with any questions or concerns.    Sincerely,     Christiano Edwards MD, [facility]  Cardiology  Text Page   August 30, 2024    Voice recognition software utilized.     Total time spent on this encounter today: Greater than 30 minutes, providing care in this encounter including, but not limited to, reviewing prior medical records, laboratory data, imaging studies, diagnostic studies, procedure notes, formulating an assessment and plan, recommendations, discussion and counseling with patient face to face, dictation.    Past Medical History:     Past Medical History:   Diagnosis Date    Cardiomyopathy (H)     Chronic heart failure with preserved ejection fraction (H) 12/14/2021    DM2 (diabetes mellitus, type 2) (H)     External hemorrhoids     Fatty liver 11/12/2021    Gastroesophageal reflux disease     History of H. pylori infection     Hypertension     Hypertriglyceridemia     Obesity          Past Surgical History:   Past Surgical History:   Procedure Laterality Date    COLONOSCOPY      CYSTOSCOPY, INJECT BOTOX N/A 1/20/2022    Procedure: EXAM UNDER ANESTHESIA WITH BOTOX INJECTION;  Surgeon: Misti Juan MD;  Location: [facility]       Medications (outpatient):  Current Outpatient Medications   Medication Sig Dispense Refill    aspirin 81 MG EC tablet Take 1 tablet (81 mg) by mouth daily 90 tablet 3    benzonatate (TESSALON) 100 MG capsule Take 1 capsule (100 mg) by mouth 3 times daily as needed for cough 30 capsule 0    esomeprazole (NEXIUM) 20 MG DR capsule       fluticasone (FLONASE) 50 MCG/ACT nasal spray Spray 1 spray into both nostrils daily 15.8 mL 0    fluticasone (FLONASE) 50 MCG/ACT nasal spray Spray 1 spray into both nostrils daily 16 g 0    hydrocortisone, Perianal, (HYDROCORTISONE) 2.5 % cream Place rectally 2 times daily as needed for hemorrhoids 30 g 0    lactulose encephalopathy (CHRONULAC) 10 GM/15ML SOLUTION Take by mouth 3 times daily      lisinopril (ZESTRIL) 10 MG tablet Take 10 mg by mouth      metFORMIN (GLUCOPHAGE XR) 500 MG 24 hr tablet Take 4 tablets (2,000 mg) by mouth daily (with dinner) 120 tablet 3    metoprolol succinate ER (TOPROL-XL) 50 MG 24 hr tablet       polyethylene glycol (MIRALAX) 17 g packet Take 1 packet by mouth 3 times daily      rosuvastatin (CRESTOR) 10 MG tablet       Semaglutide-Weight Management (WEGOVY) 1 MG/0.5ML pen Inject 1 mg Subcutaneous once a week 2 mL 0    ondansetron (ZOFRAN ODT) 4 MG ODT tab Take 1 tablet (4 mg) by mouth every 8 hours as needed for nausea (Patient not taking: Reported on 4/1/2024) 15 tablet 0    Semaglutide-Weight Management (WEGOVY) 0.25 MG/0.5ML pen Inject 0.25 mg Subcutaneous once a week (Patient not taking: Reported on 6/24/2024) 2 mL 0    Semaglutide-Weight Management (WEGOVY) 0.5 MG/0.5ML pen Inject 0.5 mg Subcutaneous once a week (Patient not taking: Reported on 6/24/2024) 2 mL 0       Allergies:  No Known  "Allergies    Social History:   History   Drug Use Unknown      History   Smoking Status    Never   Smokeless Tobacco    Never     Social History    Substance and Sexual Activity      Alcohol use: Never       Family History:  History reviewed. No pertinent family history.    Review of Systems:   A complete review of systems was negative except as mentioned in the History of Present Illness.     Objective & Physical Exam:  BP (!) 143/92 (BP Location: Left arm, Patient Position: Sitting)   Pulse 75   Ht 1.626 m (5' 4\")   Wt 103.4 kg (228 lb)   SpO2 100%   BMI 39.14 kg/m    Wt Readings from Last 2 Encounters:   08/30/24 103.4 kg (228 lb)   04/22/24 103.4 kg (228 lb)     Body mass index is 39.14 kg/m .   Body surface area is 2.16 meters squared.    Constitutional: appears stated age, in no apparent distress, appears to be well nourished  Pulmonary: clear to auscultation bilaterally, no wheezes, no rales, no increased work of breathing  Cardiovascular: JVP normal, regular rate, regular rhythm, no murmur appreciated, no lower extremity edema      Data reviewed:  Lab Results   Component Value Date    WBC 9.8 12/07/2023    WBC 8.9 01/28/2021    RBC 4.28 12/07/2023    RBC 3.99 01/28/2021    HGB 11.0 (L) 12/07/2023    HGB 11.1 (L) 01/28/2021    HCT 34.6 (L) 12/07/2023    HCT 35.0 01/28/2021    MCV 81 12/07/2023    MCV 88 01/28/2021    MCH 25.7 (L) 12/07/2023    MCH 27.8 01/28/2021    MCHC 31.8 12/07/2023    MCHC 31.7 01/28/2021    RDW 15.2 (H) 12/07/2023    RDW 13.8 01/28/2021     12/07/2023     01/28/2021     Sodium   Date Value Ref Range Status   12/07/2023 138 135 - 145 mmol/L Final     Comment:     Reference intervals for this test were updated on 09/26/2023 to more accurately reflect our healthy population. There may be differences in the flagging of prior results with similar values performed with this method. Interpretation of those prior results can be made in the context of the updated reference " intervals.    01/28/2021 137 133 - 144 mmol/L Final     Potassium   Date Value Ref Range Status   12/07/2023 4.6 3.4 - 5.3 mmol/L Final   08/25/2021 3.7 3.4 - 5.3 mmol/L Final   01/28/2021 4.1 3.4 - 5.3 mmol/L Final     Chloride   Date Value Ref Range Status   12/07/2023 103 98 - 107 mmol/L Final   08/25/2021 107 94 - 109 mmol/L Final   01/28/2021 107 94 - 109 mmol/L Final     Carbon Dioxide   Date Value Ref Range Status   01/28/2021 28 20 - 32 mmol/L Final     Carbon Dioxide (CO2)   Date Value Ref Range Status   12/07/2023 23 22 - 29 mmol/L Final   08/25/2021 25 20 - 32 mmol/L Final     Anion Gap   Date Value Ref Range Status   12/07/2023 12 7 - 15 mmol/L Final   08/25/2021 6 3 - 14 mmol/L Final   01/28/2021 2 (L) 3 - 14 mmol/L Final     Glucose   Date Value Ref Range Status   12/07/2023 105 (H) 70 - 99 mg/dL Final   08/25/2021 156 (H) 70 - 99 mg/dL Final   01/28/2021 135 (H) 70 - 99 mg/dL Final     Urea Nitrogen   Date Value Ref Range Status   12/07/2023 15.7 6.0 - 20.0 mg/dL Final   08/25/2021 9 7 - 30 mg/dL Final   01/28/2021 17 7 - 30 mg/dL Final     Creatinine   Date Value Ref Range Status   12/07/2023 0.88 0.51 - 0.95 mg/dL Final   01/28/2021 0.80 0.52 - 1.04 mg/dL Final     Creatinine POCT   Date Value Ref Range Status   03/02/2024 0.6 0.5 - 1.0 mg/dL Final     GFR Estimate   Date Value Ref Range Status   01/28/2021 90 >60 mL/min/[1.73_m2] Final     Comment:     Non  GFR Calc  Starting 12/18/2018, serum creatinine based estimated GFR (eGFR) will be   calculated using the Chronic Kidney Disease Epidemiology Collaboration   (CKD-EPI) equation.       GFR, ESTIMATED POCT   Date Value Ref Range Status   03/02/2024 >60 >60 mL/min/1.73m2 Final     Calcium   Date Value Ref Range Status   12/07/2023 9.4 8.6 - 10.0 mg/dL Final   01/28/2021 8.8 8.5 - 10.1 mg/dL Final     Bilirubin Total   Date Value Ref Range Status   12/07/2023 0.3 <=1.2 mg/dL Final   01/05/2021 0.6 0.2 - 1.3 mg/dL Final     Alkaline  "Phosphatase   Date Value Ref Range Status   12/07/2023 108 40 - 150 U/L Final     Comment:     Reference intervals for this test were updated on 11/14/2023 to more accurately reflect our healthy population. There may be differences in the flagging of prior results with similar values performed with this method. Interpretation of those prior results can be made in the context of the updated reference intervals.   01/05/2021 106 40 - 150 U/L Final     ALT   Date Value Ref Range Status   12/07/2023 13 0 - 50 U/L Final     Comment:     Reference intervals for this test were updated on 6/12/2023 to more accurately reflect our healthy population. There may be differences in the flagging of prior results with similar values performed with this method. Interpretation of those prior results can be made in the context of the updated reference intervals.     01/05/2021 37 0 - 50 U/L Final     AST   Date Value Ref Range Status   12/07/2023 20 0 - 45 U/L Final     Comment:     Reference intervals for this test were updated on 6/12/2023 to more accurately reflect our healthy population. There may be differences in the flagging of prior results with similar values performed with this method. Interpretation of those prior results can be made in the context of the updated reference intervals.   01/05/2021 25 0 - 45 U/L Final     No results for input(s): \"CHOL\", \"HDL\", \"LDL\", \"TRIG\", \"CHOLHDLRATIO\" in the last 53276 hours.   Lab Results   Component Value Date    A1C 6.3 12/07/2023      "

## 2024-08-30 NOTE — PATIENT INSTRUCTIONS
August 30, 2024    Thank you for allowing our Cardiology team to participate in your care.     Please note the following changes to your heart treatment plan:       Tests to be done:  - monitor BPs at home, goal BP approx 120/70 mmHg    Follow up:  - Follow up as needed      For scheduling, please call [phone].      Please contact our team through [employer] or our Nurse Team Voicemail service [phone], or the General Clinic [phone] for any questions or concerns.     If you are having a medical emergency, please call [phone].     Sincerely,    Christiano Edwards MD, FACC  Cardiology    [facility] - [facility] - [facility] - [place]

## 2024-08-30 NOTE — LETTER
8/30/2024    [facility]  [address] [address]    RE: Angela Villasenor       Dear Colleague,     I had the pleasure of seeing Angela Villasenor in the [facility].      Cardiology Clinic Progress Note:    August 30, 2024   Patient Name: Angela Villasenor  Patient MRN: [id number]     Consult indication: recovered cardiomyopathy     HPI:    I had the opportunity to see patient Angela Villasenor in cardiology clinic for a follow up visit.     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      Encounter done with a licensed [facility] .       Patient had been followed closely by [facility].  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  TTE 9/2021 LVEF 40%, akinesis of the mid apical anteroseptal, anterior, inferoseptal wall segments as well as the apex.  Coronary CTA 12/6/2021 report was addended 2/28/2022, initial report noted no significant abnormalities, on review, there were findings in the mid LAD suggestive of spontaneous coronary artery dissection.  Cardiac MRI 2/24/2022 demonstrated findings consistent with small subendocardial infarction involving the mid LAD, LVEF 57%, RVEF 60%, no significant valvular abnormalities.    I had seen patient in clinic 1/17/2024 for preoperative risk assessment prior to bariatric surgery.  At that time we also reviewed her prior cardiac diagnostic testing, unfortunately they were not familiar with this diagnosis of SCAD.  We had discussed this in detail, patient was assessed with follow-up CTA head to thighs, which was unrevealing of findings concerning for FMD.  It did demonstrate an incidental finding for which she followed up with her PCP, and was referred to gynecologic surgery.  We had discussed activity restrictions.   She was last seen in clinic with my colleague Ashwin SALAVDOR, weight lifting/activity restrictions were reviewed again, she is able to work with assistance from a coworker for heavy lifting.    Patient continues to do well, denies any exertional chest pain, chest pressure, abnormal shortness of breath.  BP today in clinic was mildly elevated at 143/92 mmHg, however she did not take lisinopril yesterday.  She is currently on lisinopril 10 mg daily.    Assessment and Plan/Recommendations:    # Recovered cardiomyopathy. Euvolemic.   # Suspected mid LAD SCAD, [facility] coronary CTA 12/6/2021, CMR 2/24/2022 with subendocardial infarct in the corresponding territory  # HTN, stable  # HL, on statin      - Overall patient is in stable cardiac health without symptoms concerning for angina or decompensated heart failure.  - Continue current cardiac regimen  - Activity restrictions reviewed  - Advised patient to monitor blood pressures at home, she is currently on lisinopril 10 mg daily, this can be uptitrated as needed to achieve goal blood pressure less than 130/80 mmHg  - Follow-up in cardiology clinic as needed    Thank you for allowing our team to participate in the care of Angela Villasenor.  Please do not hesitate to call or page me with any questions or concerns.    Sincerely,     Christiano Edwards MD, [facility]  Cardiology  Text Page   August 30, 2024    Voice recognition software utilized.     Total time spent on this encounter today: Greater than 30 minutes, providing care in this encounter including, but not limited to, reviewing prior medical records, laboratory data, imaging studies, diagnostic studies, procedure notes, formulating an assessment and plan, recommendations, discussion and counseling with patient face to face, dictation.    Past Medical History:     Past Medical History:   Diagnosis Date     Cardiomyopathy (H)      Chronic heart failure with preserved ejection fraction (H) 12/14/2021     DM2  (diabetes mellitus, type 2) (H)      External hemorrhoids      Fatty liver 11/12/2021     Gastroesophageal reflux disease      History of H. pylori infection      Hypertension      Hypertriglyceridemia      Obesity         Past Surgical History:   Past Surgical History:   Procedure Laterality Date     COLONOSCOPY       CYSTOSCOPY, INJECT BOTOX N/A 1/20/2022    Procedure: EXAM UNDER ANESTHESIA WITH BOTOX INJECTION;  Surgeon: Misti Juan MD;  Location: [facility]       Medications (outpatient):  Current Outpatient Medications   Medication Sig Dispense Refill     aspirin 81 MG EC tablet Take 1 tablet (81 mg) by mouth daily 90 tablet 3     benzonatate (TESSALON) 100 MG capsule Take 1 capsule (100 mg) by mouth 3 times daily as needed for cough 30 capsule 0     esomeprazole (NEXIUM) 20 MG DR capsule        fluticasone (FLONASE) 50 MCG/ACT nasal spray Spray 1 spray into both nostrils daily 15.8 mL 0     fluticasone (FLONASE) 50 MCG/ACT nasal spray Spray 1 spray into both nostrils daily 16 g 0     hydrocortisone, Perianal, (HYDROCORTISONE) 2.5 % cream Place rectally 2 times daily as needed for hemorrhoids 30 g 0     lactulose encephalopathy (CHRONULAC) 10 GM/15ML SOLUTION Take by mouth 3 times daily       lisinopril (ZESTRIL) 10 MG tablet Take 10 mg by mouth       metFORMIN (GLUCOPHAGE XR) 500 MG 24 hr tablet Take 4 tablets (2,000 mg) by mouth daily (with dinner) 120 tablet 3     metoprolol succinate ER (TOPROL-XL) 50 MG 24 hr tablet        polyethylene glycol (MIRALAX) 17 g packet Take 1 packet by mouth 3 times daily       rosuvastatin (CRESTOR) 10 MG tablet        Semaglutide-Weight Management (WEGOVY) 1 MG/0.5ML pen Inject 1 mg Subcutaneous once a week 2 mL 0     ondansetron (ZOFRAN ODT) 4 MG ODT tab Take 1 tablet (4 mg) by mouth every 8 hours as needed for nausea (Patient not taking: Reported on 4/1/2024) 15 tablet 0     Semaglutide-Weight Management (WEGOVY) 0.25 MG/0.5ML pen Inject 0.25 mg Subcutaneous once a  "week (Patient not taking: Reported on 6/24/2024) 2 mL 0     Semaglutide-Weight Management (WEGOVY) 0.5 MG/0.5ML pen Inject 0.5 mg Subcutaneous once a week (Patient not taking: Reported on 6/24/2024) 2 mL 0       Allergies:  No Known Allergies    Social History:   History   Drug Use Unknown      History   Smoking Status     Never   Smokeless Tobacco     Never     Social History    Substance and Sexual Activity      Alcohol use: Never       Family History:  History reviewed. No pertinent family history.    Review of Systems:   A complete review of systems was negative except as mentioned in the History of Present Illness.     Objective & Physical Exam:  BP (!) 143/92 (BP Location: Left arm, Patient Position: Sitting)   Pulse 75   Ht 1.626 m (5' 4\")   Wt 103.4 kg (228 lb)   SpO2 100%   BMI 39.14 kg/m    Wt Readings from Last 2 Encounters:   08/30/24 103.4 kg (228 lb)   04/22/24 103.4 kg (228 lb)     Body mass index is 39.14 kg/m .   Body surface area is 2.16 meters squared.    Constitutional: appears stated age, in no apparent distress, appears to be well nourished  Pulmonary: clear to auscultation bilaterally, no wheezes, no rales, no increased work of breathing  Cardiovascular: JVP normal, regular rate, regular rhythm, no murmur appreciated, no lower extremity edema      Data reviewed:  Lab Results   Component Value Date    WBC 9.8 12/07/2023    WBC 8.9 01/28/2021    RBC 4.28 12/07/2023    RBC 3.99 01/28/2021    HGB 11.0 (L) 12/07/2023    HGB 11.1 (L) 01/28/2021    HCT 34.6 (L) 12/07/2023    HCT 35.0 01/28/2021    MCV 81 12/07/2023    MCV 88 01/28/2021    MCH 25.7 (L) 12/07/2023    MCH 27.8 01/28/2021    MCHC 31.8 12/07/2023    MCHC 31.7 01/28/2021    RDW 15.2 (H) 12/07/2023    RDW 13.8 01/28/2021     12/07/2023     01/28/2021     Sodium   Date Value Ref Range Status   12/07/2023 138 135 - 145 mmol/L Final     Comment:     Reference intervals for this test were updated on 09/26/2023 to more " accurately reflect our healthy population. There may be differences in the flagging of prior results with similar values performed with this method. Interpretation of those prior results can be made in the context of the updated reference intervals.    01/28/2021 137 133 - 144 mmol/L Final     Potassium   Date Value Ref Range Status   12/07/2023 4.6 3.4 - 5.3 mmol/L Final   08/25/2021 3.7 3.4 - 5.3 mmol/L Final   01/28/2021 4.1 3.4 - 5.3 mmol/L Final     Chloride   Date Value Ref Range Status   12/07/2023 103 98 - 107 mmol/L Final   08/25/2021 107 94 - 109 mmol/L Final   01/28/2021 107 94 - 109 mmol/L Final     Carbon Dioxide   Date Value Ref Range Status   01/28/2021 28 20 - 32 mmol/L Final     Carbon Dioxide (CO2)   Date Value Ref Range Status   12/07/2023 23 22 - 29 mmol/L Final   08/25/2021 25 20 - 32 mmol/L Final     Anion Gap   Date Value Ref Range Status   12/07/2023 12 7 - 15 mmol/L Final   08/25/2021 6 3 - 14 mmol/L Final   01/28/2021 2 (L) 3 - 14 mmol/L Final     Glucose   Date Value Ref Range Status   12/07/2023 105 (H) 70 - 99 mg/dL Final   08/25/2021 156 (H) 70 - 99 mg/dL Final   01/28/2021 135 (H) 70 - 99 mg/dL Final     Urea Nitrogen   Date Value Ref Range Status   12/07/2023 15.7 6.0 - 20.0 mg/dL Final   08/25/2021 9 7 - 30 mg/dL Final   01/28/2021 17 7 - 30 mg/dL Final     Creatinine   Date Value Ref Range Status   12/07/2023 0.88 0.51 - 0.95 mg/dL Final   01/28/2021 0.80 0.52 - 1.04 mg/dL Final     Creatinine POCT   Date Value Ref Range Status   03/02/2024 0.6 0.5 - 1.0 mg/dL Final     GFR Estimate   Date Value Ref Range Status   01/28/2021 90 >60 mL/min/[1.73_m2] Final     Comment:     Non  GFR Calc  Starting 12/18/2018, serum creatinine based estimated GFR (eGFR) will be   calculated using the Chronic Kidney Disease Epidemiology Collaboration   (CKD-EPI) equation.       GFR, ESTIMATED POCT   Date Value Ref Range Status   03/02/2024 >60 >60 mL/min/1.73m2 Final     Calcium   Date  "Value Ref Range Status   12/07/2023 9.4 8.6 - 10.0 mg/dL Final   01/28/2021 8.8 8.5 - 10.1 mg/dL Final     Bilirubin Total   Date Value Ref Range Status   12/07/2023 0.3 <=1.2 mg/dL Final   01/05/2021 0.6 0.2 - 1.3 mg/dL Final     Alkaline Phosphatase   Date Value Ref Range Status   12/07/2023 108 40 - 150 U/L Final     Comment:     Reference intervals for this test were updated on 11/14/2023 to more accurately reflect our healthy population. There may be differences in the flagging of prior results with similar values performed with this method. Interpretation of those prior results can be made in the context of the updated reference intervals.   01/05/2021 106 40 - 150 U/L Final     ALT   Date Value Ref Range Status   12/07/2023 13 0 - 50 U/L Final     Comment:     Reference intervals for this test were updated on 6/12/2023 to more accurately reflect our healthy population. There may be differences in the flagging of prior results with similar values performed with this method. Interpretation of those prior results can be made in the context of the updated reference intervals.     01/05/2021 37 0 - 50 U/L Final     AST   Date Value Ref Range Status   12/07/2023 20 0 - 45 U/L Final     Comment:     Reference intervals for this test were updated on 6/12/2023 to more accurately reflect our healthy population. There may be differences in the flagging of prior results with similar values performed with this method. Interpretation of those prior results can be made in the context of the updated reference intervals.   01/05/2021 25 0 - 45 U/L Final     No results for input(s): \"CHOL\", \"HDL\", \"LDL\", \"TRIG\", \"CHOLHDLRATIO\" in the last 72145 hours.   Lab Results   Component Value Date    A1C 6.3 12/07/2023        Thank you for allowing me to participate in the care of your patient.      Sincerely,     Christiano Edwards MD     [facility]  cc:   Christiano Edwadrs MD  [address], [facility] [address]  [place] [id number]

## 2024-09-22 ENCOUNTER — HEALTH MAINTENANCE LETTER (OUTPATIENT)
Age: 44
End: 2024-09-22

## 2024-11-08 ENCOUNTER — HOSPITAL ENCOUNTER (EMERGENCY)
Facility: CLINIC | Age: 44
Discharge: HOME | End: 2024-11-08
Attending: EMERGENCY MEDICINE | Admitting: EMERGENCY MEDICINE
Payer: COMMERCIAL

## 2024-11-08 VITALS
RESPIRATION RATE: 16 BRPM | TEMPERATURE: 96.8 F | HEART RATE: 63 BPM | OXYGEN SATURATION: 99 % | BODY MASS INDEX: 39.2 KG/M2 | WEIGHT: 228.4 LBS | SYSTOLIC BLOOD PRESSURE: 151 MMHG | DIASTOLIC BLOOD PRESSURE: 88 MMHG

## 2024-11-08 DIAGNOSIS — M54.50 ACUTE RIGHT-SIDED LOW BACK PAIN WITHOUT SCIATICA: ICD-10-CM

## 2024-11-08 DIAGNOSIS — R73.09 LABILE BLOOD GLUCOSE: ICD-10-CM

## 2024-11-08 LAB — GLUCOSE BLDC GLUCOMTR-MCNC: 119 MG/DL (ref 70–99)

## 2024-11-08 PROCEDURE — 250N000013 HC RX MED GY IP 250 OP 250 PS 637: Performed by: EMERGENCY MEDICINE

## 2024-11-08 PROCEDURE — 99284 EMERGENCY DEPT VISIT MOD MDM: CPT

## 2024-11-08 PROCEDURE — 82962 GLUCOSE BLOOD TEST: CPT

## 2024-11-08 PROCEDURE — 250N000011 HC RX IP 250 OP 636: Performed by: EMERGENCY MEDICINE

## 2024-11-08 PROCEDURE — 96372 THER/PROPH/DIAG INJ SC/IM: CPT | Performed by: EMERGENCY MEDICINE

## 2024-11-08 RX ORDER — LIDOCAINE 4 G/G
1 PATCH TOPICAL ONCE
Status: DISCONTINUED | OUTPATIENT
Start: 2024-11-08 | End: 2024-11-08 | Stop reason: HOSPADM

## 2024-11-08 RX ORDER — KETOROLAC TROMETHAMINE 15 MG/ML
15 INJECTION, SOLUTION INTRAMUSCULAR; INTRAVENOUS ONCE
Status: COMPLETED | OUTPATIENT
Start: 2024-11-08 | End: 2024-11-08

## 2024-11-08 RX ADMIN — KETOROLAC TROMETHAMINE 15 MG: 15 INJECTION, SOLUTION INTRAMUSCULAR; INTRAVENOUS at 02:43

## 2024-11-08 RX ADMIN — LIDOCAINE 1 PATCH: 4 PATCH TOPICAL at 02:43

## 2024-11-08 ASSESSMENT — COLUMBIA-SUICIDE SEVERITY RATING SCALE - C-SSRS
1. IN THE PAST MONTH, HAVE YOU WISHED YOU WERE DEAD OR WISHED YOU COULD GO TO SLEEP AND NOT WAKE UP?: NO
6. HAVE YOU EVER DONE ANYTHING, STARTED TO DO ANYTHING, OR PREPARED TO DO ANYTHING TO END YOUR LIFE?: NO
2. HAVE YOU ACTUALLY HAD ANY THOUGHTS OF KILLING YOURSELF IN THE PAST MONTH?: NO

## 2024-11-08 ASSESSMENT — ACTIVITIES OF DAILY LIVING (ADL): ADLS_ACUITY_SCORE: 0

## 2024-11-08 NOTE — ED PROVIDER NOTES
Emergency Department Note      History of Present Illness     Chief Complaint   Hypoglycemia      HPI   Angela Villasenor is a 44 year old female presenting with concern for hypoglycemia.  She has a glucose monitor that read that her blood sugar was 66.  She has been checking it for the past few days and it has been consistently low.  She states she replaces these every 2 weeks, and the current monitor has been in place for 4 days.  During those 4 days it has been reading low consistently.  No fever, chills, chest pain, shortness of breath, nausea, vomiting.  She does endorse right lower back pain and right arm pain.  She reports she has to do lots of heavy lifting at work.  No hematuria, dysuria, numbness/tingling or weakness in the extremities.    Independent Historian   None    Review of External Notes   10/17/24: UC note reviewed    Past Medical History     Medical History and Problem List   Past Medical History:   Diagnosis Date    Cardiomyopathy (H)     Chronic heart failure with preserved ejection fraction (H) 12/14/2021    External hemorrhoids     Fatty liver 11/12/2021    Gastroesophageal reflux disease     History of H. pylori infection     Hypertension     Hypertriglyceridemia     Obesity     Type 2 diabetes mellitus        Medications   aspirin 81 MG EC tablet  esomeprazole (NEXIUM) 20 MG DR capsule  fluticasone (FLONASE) 50 MCG/ACT nasal spray  fluticasone (FLONASE) 50 MCG/ACT nasal spray  hydrocortisone, Perianal, (HYDROCORTISONE) 2.5 % cream  lactulose encephalopathy (CHRONULAC) 10 GM/15ML SOLUTION  lisinopril (ZESTRIL) 10 MG tablet  metFORMIN (GLUCOPHAGE XR) 500 MG 24 hr tablet  metoprolol succinate ER (TOPROL-XL) 50 MG 24 hr tablet  polyethylene glycol (MIRALAX) 17 g packet  rosuvastatin (CRESTOR) 10 MG tablet  Semaglutide-Weight Management (WEGOVY) 1 MG/0.5ML pen        Surgical History   Past Surgical History:   Procedure Laterality Date    COLONOSCOPY      CYSTOSCOPY, INJECT BOTOX N/A 1/20/2022     Procedure: EXAM UNDER ANESTHESIA WITH BOTOX INJECTION;  Surgeon: Misti Juan MD;  Location: [facility]       Physical Exam     Patient Vitals for the past 24 hrs:   BP Temp Temp src Pulse Resp SpO2 Weight   11/08/24 0218 (!) 151/88 96.8  F (36  C) Temporal 63 16 99 % 103.6 kg (228 lb 6.3 oz)     Physical Exam  General: No acute distress  Head: No obvious trauma to head.  Ears, Nose, Throat:  External ears normal.  Nose normal.  No pharyngeal erythema, swelling or exudate.  Midline uvula. Moist mucus membranes.  Eyes:  Conjunctivae clear.   Neck: Normal range of motion.  Neck supple.   CV: Regular rate and rhythm.  No murmurs.      Respiratory: Effort normal and breath sounds normal.  No wheezing or crackles.   Gastrointestinal: Soft.  No distension. There is no tenderness.  There is no rigidity, no rebound and no guarding.   Musculoskeletal: Normal range of motion.  Pain to palpation over the right medial epicondyles of the humerus.  Right lower back tenderness to palpation.  No midline tenderness to palpation.  No lower extremity edema  Neuro: Alert. Moving all extremities appropriately.  Normal speech.    Skin: Skin is warm and dry.  No rash noted.   Psych: Normal mood and affect. Behavior is normal.       Diagnostics     Lab Results   Labs Ordered and Resulted from Time of ED Arrival to Time of ED Departure   GLUCOSE BY METER - Abnormal       Result Value    GLUCOSE BY METER POCT 119 (*)        Imaging   No orders to display         Independent Interpretation   None    ED Course      Medications Administered   Medications   Lidocaine (LIDOCARE) 4 % Patch 1 patch (1 patch Transdermal $Patch/Med Applied 11/8/24 0243)   ketorolac (TORADOL) injection 15 mg (15 mg Intramuscular $Given 11/8/24 0243)       Procedures   Procedures     Discussion of Management   None    ED Course        Additional Documentation  None    Medical Decision Making / Diagnosis     CMS Diagnoses: None    MIPS       None    MDM    Angela Villasenor is a 44 year old female presenting for concern for hypoglycemia.  Her blood sugar here in the emergency department is 119.  Her monitor continues to read 66.  She has had this monitor for 4 days and has been reading low this entire time.  I informed her to contact her primary care provider in the morning and inform them that her current monitor is reading falsely low and ask for a replacement.  In the meantime, she is instructed to check her blood sugar via finger prick.  She states she is also been having right low back pain and right arm pain.  Symptoms seem to be correlated with musculoskeletal pain.  She states she has to do lots of heavy lifting at work.  She is given a Toradol injection and a lidocaine patch.  She reports she is feeling better.  She is instructed to alternate Tylenol and ibuprofen for pain, as well as trying heat and/or ice.  She is encouraged to follow-up with her primary care provider.  Return precautions are given and she verbalizes understanding.  She is discharged home in stable condition.    Disposition   The patient was discharged.     Diagnosis     ICD-10-CM    1. Acute right-sided low back pain without sciatica  M54.50       2. Labile blood glucose  R73.09            Discharge Medications   Discharge Medication List as of 11/8/2024  3:43 AM            MD George Hsieh Stephen, MD  11/08/24 0438

## 2024-11-08 NOTE — ED TRIAGE NOTES
Pt reports glucose sensor is reporting a blood sugar of 66.  in triage. Feels hypoglycemic. Had small meal an hour ago.

## 2024-11-08 NOTE — DISCHARGE INSTRUCTIONS
Please call your primary doctor in the morning to inform them that your blood sugar monitor is falsely reading low.  In the meantime, check your blood sugar at home by finger prick.  We recommend that you take Tylenol and ibuprofen for pain and to follow-up with your primary doctor regarding this.  You can also try heat and/or ice for pain relief.  20 minutes on, 20 minutes off, repeat.  Please return the emergency department if you develop any new or concerning symptoms.

## 2024-11-18 ENCOUNTER — OFFICE VISIT (OUTPATIENT)
Dept: URGENT CARE | Facility: URGENT CARE | Age: 44
End: 2024-11-18
Payer: COMMERCIAL

## 2024-11-18 VITALS
SYSTOLIC BLOOD PRESSURE: 150 MMHG | OXYGEN SATURATION: 100 % | HEART RATE: 66 BPM | WEIGHT: 226 LBS | DIASTOLIC BLOOD PRESSURE: 90 MMHG | BODY MASS INDEX: 38.79 KG/M2 | RESPIRATION RATE: 16 BRPM | TEMPERATURE: 97.3 F

## 2024-11-18 DIAGNOSIS — E11.9 TYPE 2 DIABETES MELLITUS WITHOUT COMPLICATION, WITHOUT LONG-TERM CURRENT USE OF INSULIN (H): Chronic | ICD-10-CM

## 2024-11-18 DIAGNOSIS — L03.317 ABSCESS AND CELLULITIS OF GLUTEAL REGION: Primary | ICD-10-CM

## 2024-11-18 DIAGNOSIS — L02.31 ABSCESS AND CELLULITIS OF GLUTEAL REGION: Primary | ICD-10-CM

## 2024-11-18 PROCEDURE — 99213 OFFICE O/P EST LOW 20 MIN: CPT | Performed by: PHYSICIAN ASSISTANT

## 2024-11-18 RX ORDER — LIDOCAINE 50 MG/G
PATCH TOPICAL
COMMUNITY
Start: 2024-10-09

## 2024-11-18 RX ORDER — IBUPROFEN 200 MG
400-600 TABLET ORAL
COMMUNITY
Start: 2024-05-17

## 2024-11-18 RX ORDER — NAPROXEN 500 MG/1
500 TABLET ORAL 2 TIMES DAILY WITH MEALS
Qty: 30 TABLET | Refills: 3 | Status: SHIPPED | OUTPATIENT
Start: 2024-11-18 | End: 2025-11-18

## 2024-11-18 RX ORDER — CYCLOBENZAPRINE HCL 5 MG
5-10 TABLET ORAL
COMMUNITY
Start: 2024-10-17

## 2024-11-18 RX ORDER — MUPIROCIN 20 MG/G
OINTMENT TOPICAL 3 TIMES DAILY
Qty: 30 G | Refills: 0 | Status: SHIPPED | OUTPATIENT
Start: 2024-11-18

## 2024-11-18 RX ORDER — SENNOSIDES A AND B 8.6 MG/1
8.6 TABLET, FILM COATED ORAL
COMMUNITY
Start: 2024-10-09

## 2024-11-18 RX ORDER — FLASH GLUCOSE SENSOR
KIT MISCELLANEOUS
COMMUNITY
Start: 2024-10-20

## 2024-11-18 RX ORDER — FERROUS GLUCONATE 324(38)MG
1 TABLET ORAL
COMMUNITY
Start: 2024-10-22

## 2024-11-18 NOTE — PROGRESS NOTES
Assessment & Plan     Abscess and cellulitis of gluteal region    Area is indurated and hard  Warm compresses  Take medications    A skin abscess is a bacterial infection that forms a pocket of pus. A boil is a kind of skin abscess. The doctor may have cut an opening in the abscess so that the pus can drain out. You may have gauze in the cut so that the abscess will stay open and keep draining. You may need antibiotics. You will need to follow up with your doctor to make sure the infection has gone away     - naproxen (NAPROSYN) 500 MG tablet  Dispense: 30 tablet; Refill: 3  - mupirocin (BACTROBAN) 2 % external ointment  Dispense: 30 g; Refill: 0  - amoxicillin-clavulanate (AUGMENTIN) 875-125 MG tablet  Dispense: 20 tablet; Refill: 0    Type 2 diabetes mellitus without complication, without long-term current use of insulin (H)    Patient is diabetic, monitor blood sugars  Monitor for any worsening symptoms return to clinic       At today's visit with Angela Villasenor , we discussed results, diagnosis, medications and formulated a plan.  We also discussed red flags for immediate return to clinic/ER, as well as indications for follow up with PCP if not improved in 3 days. Patient understood and agreed to plan. Angela Villasenor was discharged with stable vitals and has no further questions.       No follow-ups on file.    Yusef Wiley, [facility], PA-C  [facility]    Destin Miller is a 44 year old female who presents to clinic today for the following health issues:  Chief Complaint   Patient presents with    Vaginal Problem     Pt has a rash, vaginal sore that is painful//has had one before last year        HPI  Review of Systems  Constitutional, HEENT, cardiovascular, pulmonary, gi and gu systems are negative, except as otherwise noted.      Objective    BP (!) 150/90   Pulse 66   Temp 97.3  F (36.3  C) (Tympanic)   Resp 16   Wt 102.5 kg (226 lb)   SpO2 100%   BMI 38.79 kg/m     Physical Exam   GENERAL: alert and no distress  ABDOMEN: soft, nontender, no hepatosplenomegaly, no masses and bowel sounds normal  MS: pos for right gluteal tenderness  SKIN: pos for localize abscess  NEURO: Normal strength and tone, mentation intact and speech normal  PSYCH: mentation appears normal, affect normal/bright

## 2024-11-26 ENCOUNTER — ANCILLARY PROCEDURE (OUTPATIENT)
Dept: GENERAL RADIOLOGY | Facility: CLINIC | Age: 44
End: 2024-11-26
Attending: PHYSICIAN ASSISTANT
Payer: COMMERCIAL

## 2024-11-26 ENCOUNTER — OFFICE VISIT (OUTPATIENT)
Dept: URGENT CARE | Facility: URGENT CARE | Age: 44
End: 2024-11-26
Payer: COMMERCIAL

## 2024-11-26 VITALS
HEART RATE: 54 BPM | OXYGEN SATURATION: 100 % | SYSTOLIC BLOOD PRESSURE: 200 MMHG | TEMPERATURE: 96.9 F | DIASTOLIC BLOOD PRESSURE: 115 MMHG | RESPIRATION RATE: 16 BRPM

## 2024-11-26 DIAGNOSIS — J10.1 INFLUENZA A: ICD-10-CM

## 2024-11-26 DIAGNOSIS — E11.9 TYPE 2 DIABETES MELLITUS WITHOUT COMPLICATION, WITHOUT LONG-TERM CURRENT USE OF INSULIN (H): Chronic | ICD-10-CM

## 2024-11-26 DIAGNOSIS — I10 ESSENTIAL HYPERTENSION: ICD-10-CM

## 2024-11-26 DIAGNOSIS — R50.9 FEVER, UNSPECIFIED FEVER CAUSE: ICD-10-CM

## 2024-11-26 DIAGNOSIS — J10.1 INFLUENZA A: Primary | ICD-10-CM

## 2024-11-26 DIAGNOSIS — R05.9 COUGH, UNSPECIFIED TYPE: ICD-10-CM

## 2024-11-26 DIAGNOSIS — R07.0 THROAT PAIN: ICD-10-CM

## 2024-11-26 PROCEDURE — T1013 SIGN LANG/ORAL INTERPRETER: HCPCS | Mod: GT

## 2024-11-26 PROCEDURE — 87804 INFLUENZA ASSAY W/OPTIC: CPT | Performed by: PHYSICIAN ASSISTANT

## 2024-11-26 PROCEDURE — 87635 SARS-COV-2 COVID-19 AMP PRB: CPT | Performed by: PHYSICIAN ASSISTANT

## 2024-11-26 PROCEDURE — 87651 STREP A DNA AMP PROBE: CPT | Performed by: PHYSICIAN ASSISTANT

## 2024-11-26 PROCEDURE — 71046 X-RAY EXAM CHEST 2 VIEWS: CPT | Mod: TC | Performed by: RADIOLOGY

## 2024-11-26 RX ORDER — IBUPROFEN 600 MG/1
600 TABLET, FILM COATED ORAL EVERY 6 HOURS PRN
Qty: 30 TABLET | Refills: 0 | Status: SHIPPED | OUTPATIENT
Start: 2024-11-26 | End: 2025-11-26

## 2024-11-26 RX ORDER — BENZONATATE 200 MG/1
200 CAPSULE ORAL 3 TIMES DAILY PRN
Qty: 21 CAPSULE | Refills: 0 | Status: SHIPPED | OUTPATIENT
Start: 2024-11-26 | End: 2024-12-03

## 2024-11-26 RX ORDER — OSELTAMIVIR PHOSPHATE 75 MG/1
75 CAPSULE ORAL 2 TIMES DAILY
Qty: 10 CAPSULE | Refills: 0 | Status: SHIPPED | OUTPATIENT
Start: 2024-11-26 | End: 2024-12-01

## 2024-11-26 RX ORDER — ACETAMINOPHEN 500 MG
500-1000 TABLET ORAL EVERY 6 HOURS PRN
Qty: 30 TABLET | Refills: 0 | Status: SHIPPED | OUTPATIENT
Start: 2024-11-26

## 2024-11-26 NOTE — PROGRESS NOTES
Assessment & Plan     Influenza A    Patient given information about influenza.  Patient understands they are contagious until their fever has resolved without the use of motrin or tylenol.  At that time they can return to school/work.  Patient is to monitor for any worsening symptoms and return to the clinic if this occurs.  The most common complication of influenza is Pneumonia or other respiratory problems especially in those with underlying lung problems including asthma and COPD.  Patient will follow up if this occurs.    Influenza pos  Covid penidng  Chest xray Negative for acute findings, read by Yusef Wiley PA-C [facility] at time of visit.    - oseltamivir (TAMIFLU) 75 MG capsule  Dispense: 10 capsule; Refill: 0    Fever, unspecified fever cause    A fever is a high body temperature and is the body's reaction to an illness. It's one way your body fights illness. A temperature of up to 102 F can be helpful, because it helps the body respond to infection. Most healthy people can have a fever as high as 103 F to 104 F for short periods of time without problems.  Its important to stay well hydrated with a fever and avoid being in the heat.  A fever can be treated with medications provided, but If symptoms worsen then be seen by your PCP or go to the .     Motrin and tylenol for fever   - Influenza A & B Antigen - Clinic Collect    - ibuprofen (ADVIL/MOTRIN) 600 MG tablet  Dispense: 30 tablet; Refill: 0  - acetaminophen (TYLENOL) 500 MG tablet  Dispense: 30 tablet; Refill: 0    Throat pain    You had a strep test done today that was neg.  We will perform a strep culture and if any positive results come back we will call you and call in antibiotics.  At this time, this appears to be a viral infection and requires symptomatic treatment.  Most viral sore throats will resolve with in a few days.  If your throat pain worsens then please be  rechecked in the  or by your PCP.      - ibuprofen (ADVIL/MOTRIN) 600 MG tablet   Dispense: 30 tablet; Refill: 0  - acetaminophen (TYLENOL) 500 MG tablet  Dispense: 30 tablet; Refill: 0    Essential hypertension    STOP ALL COLD MEDICATIONS   Continue to take blood pressure medications, she has not taken them today  Monitor blood pressures at home  Follow up with PCP for recheck       Type 2 diabetes mellitus without complication, without long-term current use of insulin (H)    Patient is diabetic, monitor blood sugars      Cough, unspecified type    Tessalon for coughing    - benzonatate (TESSALON) 200 MG capsule  Dispense: 21 capsule; Refill: 0           At today's visit with Angela Villasenor , we discussed results, diagnosis, medications and formulated a plan.  We also discussed red flags for immediate return to clinic/ER, as well as indications for follow up with PCP if not improved in 3 days. Patient understood and agreed to plan. Angela Villasenor was discharged with stable vitals and has no further questions.     > 40 min spent with patient discussing blood pressure, cold medications, monitor blood pressure and stop cold medications, charting, discussing labs and xray results, follow up care and discharge    No follow-ups on file.    Yusef Wiley, [facility], PA-C  [facility] URGENT CARE [facility]    Destin Miller is a 44 year old female who presents to clinic today for the following health issues:  Chief Complaint   Patient presents with    URI     Patient here with complaint of sore throat, cough, runny nose, headache, chills, fever and body aches. Symptoms started on Friday.        HPI  Review of Systems  Constitutional, HEENT, cardiovascular, pulmonary, GI, , musculoskeletal, neuro, skin, endocrine and psych systems are negative, except as otherwise noted.      Objective    BP (!) 200/115   Pulse 54   Temp 96.9  F (36.1  C) (Tympanic)   Resp 16   SpO2 100%   Physical Exam   GENERAL: alert and no distress  EYES: Eyes grossly normal to inspection, PERRL and conjunctivae and  sclerae normal  HENT: normal cephalic/atraumatic, ear canals and TM's normal, nose and mouth without ulcers or lesions, nasal mucosa edematous , rhinorrhea clear, oropharynx clear, and oral mucous membranes moist  NECK: no adenopathy, no asymmetry, masses, or scars  RESP: lungs clear to auscultation - no rales, rhonchi or wheezes  CV: regular rate and rhythm, normal S1 S2, no S3 or S4, no murmur, click or rub, no peripheral edema  ABDOMEN: soft, nontender, no hepatosplenomegaly, no masses and bowel sounds normal  MS: no gross musculoskeletal defects noted, no edema  SKIN: no suspicious lesions or rashes  NEURO: Normal strength and tone, mentation intact and speech normal  PSYCH: mentation appears normal, affect normal/bright    Results for orders placed or performed in visit on 11/26/24   XR Chest 2 Views     Status: None (Preliminary result)    Narrative    XR CHEST 2 VIEWS   11/26/2024 11:49 AM     HISTORY: Influenza A; Fever, unspecified fever cause.    COMPARISON: 6/24/2024.      Impression    IMPRESSION: No acute cardiopulmonary disease.   Results for orders placed or performed in visit on 11/26/24   Influenza A & B Antigen - Clinic Collect     Status: Abnormal    Specimen: Nose; Swab   Result Value Ref Range    Influenza A antigen Positive (A) Negative    Influenza B antigen Negative Negative    Narrative    Test results must be correlated with clinical data. If necessary, results should be confirmed by a molecular assay or viral culture.   Streptococcus A Rapid Screen w/Reflex to PCR - Clinic Collect     Status: Normal    Specimen: Throat; Swab   Result Value Ref Range    Group A Strep antigen Negative Negative

## 2024-11-27 LAB — SARS-COV-2 RNA RESP QL NAA+PROBE: NEGATIVE

## 2024-12-14 NOTE — PROGRESS NOTES
"2024      Return Medical Weight Management Note     Angela Villasenor  MRN:  [id number]  :  1980    Assessment & Plan   Problem List Items Addressed This Visit       Type 2 diabetes mellitus (H) (Chronic)     Metformin and Ozempic not tolerated.    10/9/2024 A1c 5.8         Morbid obesity (H) - Primary     Continues to have interest in bariatric surgery.  Currently not a candidate.  Reviewed Dr. Keating's requirements.  She will establish care with a therapist and once stable may return to Dr. Cadena for bariatric psychological evaluation update.      RN/ helped patient set up appointment with [facility] to start therapy recommended.  Dietician visits q 2 months for the next year was also scheduled.      Pt she will work on did not tolerate metformin or ozempic.  Healthy lifestyle changes to facilitate weight loss.              AOM Considerations:  Phentermine:    Topiramate:    GLP-1:  Not tolerated    Naltrexone:    Wellbutrin:    Metformin:  Not tolerated  Contrave:  Qsymia:    PATIENT INSTRUCTIONS:    37 minutes spent on the date of the encounter doing chart review, history and exam, result review, counseling, developing plan of care, documentation, and further activities as noted      INTERVAL HISTORY:  Angela returns for medical weight management follow up.  Last seen 2024.  Pt seen for initial surgical assessment in 2023.   Started bariatric process. Was seen by Dr. Lao and was not appropriate for surgery at this time.      \"In order to be deemed a viable candidate for bariatric surgery, this patient will need to complete the following treatment recommendations: Continue to work with the bariatric dietitian and physician's assistant to focus on appropriate nutrition and eating behaviors as well as educating her on the surgery, maintain mindful eating behaviors, plan out her meals, increase activity level, work with a culturally sensitive individual " "psychotherapist to focus on improved coping mechanisms (may be too challenging because of the cultural constraints), and follow up with this clinician only after her dietitian and physician's assistant feels she is in a good position to proceed.\"     Started Wegovy. Stopped using the Wegovy because it started causing pain.  Caused muscle pain, nausea, vomiting.  Was on it for 2 months.  Has been off it since May.  Was taking the metformin 2000 mg daily.  Metformin helped her get full quicker and was doing less snacking.  Has stopped 2 months ago due to hypoglycemia.  BS 66.      Last saw dietitian 4/20/2024:  discussed increasing protein and fiber. Recommended eat 3 meals/day. Appropriate questions about healthy oils and beverages. Will send [employer] info states she had trouble connecting with dietitian since.   (appointment scheduling is a problem with language barrier)      Has lost 10 pounds.  Changed some of her eating habits.   Has been walking for the last month and thinks this is what is helping with her weight loss.       Was in the process for baraitric surgery and would like to go back to this. Not sure why she was unable to continue.           WEIGHT METRICS:  Body mass index is 37.83 kg/m .   Current Weight: 220 lb 6.4 oz (100 kg)  Last Visits Weight: 228 lb (103.4 kg)  Initial Weight (lbs): 228 lbs  Cumulative weight loss (lbs): 7.6  Weight Loss Percentage: 3.33%    Wt Readings from Last 10 Encounters:   12/16/24 220 lb 6.4 oz (100 kg)   11/18/24 226 lb (102.5 kg)   11/08/24 228 lb 6.3 oz (103.6 kg)   08/30/24 228 lb (103.4 kg)   04/22/24 228 lb (103.4 kg)   04/01/24 228 lb (103.4 kg)   02/29/24 227 lb (103 kg)   02/26/24 223 lb (101.2 kg)   02/21/24 226 lb (102.5 kg)   01/17/24 225 lb 9.6 oz (102.3 kg)             LABS:  Labs reviewed in Epic    BP Readings from Last 6 Encounters:   12/16/24 122/79   11/26/24 (!) 200/115   11/18/24 (!) 150/90   11/08/24 (!) 151/88   08/30/24 (!) 143/92   06/24/24 " "129/80       Pulse Readings from Last 6 Encounters:   12/16/24 76   11/26/24 54   11/18/24 66   11/08/24 63   08/30/24 75   06/24/24 76       PE:  /79   Pulse 76   Ht 5' 4\" (1.626 m)   Wt 220 lb 6.4 oz (100 kg)   SpO2 100%   BMI 37.83 kg/m    GENERAL: Healthy, alert and no distress  RESP: No audible wheeze, cough, or visible cyanosis.  No visible retractions or increased work of breathing.    SKIN: Visible skin clear. No significant rash, abnormal pigmentation or lesions.  PSYCH: Mentation appears normal, affect normal/bright, judgement and insight intact        "

## 2024-12-16 ENCOUNTER — OFFICE VISIT (OUTPATIENT)
Dept: SURGERY | Facility: CLINIC | Age: 44
End: 2024-12-16
Payer: COMMERCIAL

## 2024-12-16 VITALS
HEART RATE: 76 BPM | OXYGEN SATURATION: 100 % | WEIGHT: 220.4 LBS | SYSTOLIC BLOOD PRESSURE: 122 MMHG | HEIGHT: 64 IN | DIASTOLIC BLOOD PRESSURE: 79 MMHG | BODY MASS INDEX: 37.63 KG/M2

## 2024-12-16 DIAGNOSIS — E66.01 MORBID OBESITY (H): Primary | ICD-10-CM

## 2024-12-16 DIAGNOSIS — E11.9 TYPE 2 DIABETES MELLITUS WITHOUT COMPLICATION, WITHOUT LONG-TERM CURRENT USE OF INSULIN (H): Chronic | ICD-10-CM

## 2024-12-16 PROCEDURE — 99214 OFFICE O/P EST MOD 30 MIN: CPT | Performed by: PHYSICIAN ASSISTANT

## 2024-12-16 PROCEDURE — G2211 COMPLEX E/M VISIT ADD ON: HCPCS | Performed by: PHYSICIAN ASSISTANT

## 2024-12-16 NOTE — ASSESSMENT & PLAN NOTE
Continues to have interest in bariatric surgery.  Currently not a candidate.  Reviewed Dr. Keating's requirements.  She will establish care with a therapist and once stable may return to Dr. Cadena for bariatric psychological evaluation update.      RN/ helped patient set up appointment with [facility] to start therapy recommended.  Dietician visits q 2 months for the next year was also scheduled.      Pt she will work on did not tolerate metformin or ozempic.  Healthy lifestyle changes to facilitate weight loss.

## 2024-12-16 NOTE — PROGRESS NOTES
Angela returns for medical weight management follow up.  Last seen 4/24 by dietician.  Pt seen for initial surgical assessment in December but has been lost to RD follow up since then. Is not appropriate for surgery  at this time per psych notes. On  and tolerating Wegovy. Discussed increasing protein and fiber. Recommended eat 3 meals/day. Appropriate questions about healthy oils and beverages. Will send [employer] info from     Stopped using the Wegovy because it started causing pain.  Caused muscle pain, nausea, vomiting.  Was on it for 2 months.  Has been off it since May.  Was taking the metformin 2000 mg daily.  Metformin helped her get full quicker and was doing less snacking.  Has been off for 2 months.  Had some hypogylcemia.  BS 66. Has changed some of her eating habits.      Has been walking for the last month and thinks this is what is helping with her weight loss.      Was in the process for baraitric surgery and would like to go back to this.     PT was told to see

## 2025-01-12 ENCOUNTER — HEALTH MAINTENANCE LETTER (OUTPATIENT)
Age: 45
End: 2025-01-12

## 2025-03-31 ENCOUNTER — ANCILLARY PROCEDURE (OUTPATIENT)
Dept: GENERAL RADIOLOGY | Facility: CLINIC | Age: 45
End: 2025-03-31
Attending: EMERGENCY MEDICINE
Payer: COMMERCIAL

## 2025-03-31 ENCOUNTER — OFFICE VISIT (OUTPATIENT)
Dept: URGENT CARE | Facility: URGENT CARE | Age: 45
End: 2025-03-31
Payer: COMMERCIAL

## 2025-03-31 VITALS
BODY MASS INDEX: 39.14 KG/M2 | HEART RATE: 70 BPM | DIASTOLIC BLOOD PRESSURE: 82 MMHG | WEIGHT: 228 LBS | TEMPERATURE: 97.6 F | RESPIRATION RATE: 20 BRPM | OXYGEN SATURATION: 100 % | SYSTOLIC BLOOD PRESSURE: 133 MMHG

## 2025-03-31 DIAGNOSIS — J20.9 ACUTE BRONCHITIS WITH SYMPTOMS > 10 DAYS: ICD-10-CM

## 2025-03-31 DIAGNOSIS — J01.90 ACUTE SINUSITIS WITH SYMPTOMS > 10 DAYS: Primary | ICD-10-CM

## 2025-03-31 LAB
DEPRECATED S PYO AG THROAT QL EIA: NEGATIVE
S PYO DNA THROAT QL NAA+PROBE: NOT DETECTED

## 2025-03-31 PROCEDURE — 3079F DIAST BP 80-89 MM HG: CPT | Performed by: EMERGENCY MEDICINE

## 2025-03-31 PROCEDURE — 99214 OFFICE O/P EST MOD 30 MIN: CPT | Performed by: EMERGENCY MEDICINE

## 2025-03-31 PROCEDURE — 71046 X-RAY EXAM CHEST 2 VIEWS: CPT | Mod: TC | Performed by: RADIOLOGY

## 2025-03-31 PROCEDURE — 87651 STREP A DNA AMP PROBE: CPT | Performed by: EMERGENCY MEDICINE

## 2025-03-31 PROCEDURE — 3075F SYST BP GE 130 - 139MM HG: CPT | Performed by: EMERGENCY MEDICINE

## 2025-03-31 RX ORDER — BENZONATATE 100 MG/1
100 CAPSULE ORAL 3 TIMES DAILY PRN
Qty: 21 CAPSULE | Refills: 0 | Status: SHIPPED | OUTPATIENT
Start: 2025-03-31 | End: 2025-03-31

## 2025-03-31 RX ORDER — GUAIFENESIN 600 MG/1
1200 TABLET, EXTENDED RELEASE ORAL 2 TIMES DAILY
Qty: 12 TABLET | Refills: 0 | Status: SHIPPED | OUTPATIENT
Start: 2025-03-31

## 2025-03-31 NOTE — PROGRESS NOTES
Assessment & Plan     Diagnosis:    ICD-10-CM    1. Acute sinusitis with symptoms > 10 days  J01.90 Streptococcus A Rapid Screen w/Reflex to PCR - Clinic Collect     Group A Streptococcus PCR Throat Swab     amoxicillin-clavulanate (AUGMENTIN) 875-125 MG tablet     guaiFENesin (MUCINEX) 600 MG 12 hr tablet      2. Acute bronchitis with symptoms > 10 days  J20.9 XR Chest 2 Views     DISCONTINUED: benzonatate (TESSALON) 100 MG capsule          Medical Decision Making:  Angela Villasenor is a 45 year old female who presents for evaluation of facial pain, cough, sore throat and runny nose x1 month. CXR clear without signs of pneumonia on my read.  Signs and symptoms are consistent with sinusitis.  Discussed viral vs bacterial sinusitis with the patient.  Outpatient medications ordered as noted above.  Doubt fungal sinusitis, meningitis, encephalitis, cavernous sinus thrombosis, ocular pathology, intracerebral bleed, serious bacterial infection otherwise.  Supportive outpatient management indicated.  Patient verbalizes understanding and agreement with the plan including reasons to go to the ER. All questions answered.       Ramses Sauceda PA-C  [facility] URGENT CARE    Subjective     Angela Villasenor is a 45 year old female who presents to clinic today for the following health issues:  Chief Complaint   Patient presents with    Pharyngitis     Sore throat and cough for one month       HPI  Patient with sore throat, facial pain and pressure, cough for 1 month.  Over the last few days it seems is gotten worse, especially with a lot of pressure in her face, causing headaches as well.  She is been having a lot of yellow and green phlegm coming out of her nose and coughing it up.  No chest pain, difficulty breathing, fevers, nausea, vomiting, diarrhea, ear pain (just pressure). No hx of seasonal allergies. Pain swallowing but can get food down OK.    Review of Systems    See HPI    Objective      Vitals: /82 (BP  Location: Left arm, Patient Position: Sitting, Cuff Size: Adult Large)   Pulse 70   Temp 97.6  F (36.4  C) (Temporal)   Resp 20   Wt 103.4 kg (228 lb)   LMP 03/30/2025   SpO2 100%   BMI 39.14 kg/m      Patient Vitals for the past 24 hrs:   BP Temp Temp src Pulse Resp SpO2 Weight   03/31/25 1031 133/82 97.6  F (36.4  C) Temporal 70 20 100 % 103.4 kg (228 lb)       Vital signs reviewed by: Ramses Sauceda PA-C    Physical Exam   Constitutional: Patient is alert and cooperative. No acute distress.  Ears: Right TM is karey. Left TM is normal. External ear canals are normal.  Mouth: Mucous membranes are moist. Normal tongue and tonsil. Posterior oropharynx is clear. No exudates.  Nose: Maxillary sinus tenderness to percussion.  No septal mass or epistaxis.  Eyes: Conjunctivae and lids are normal.  Cardiovascular: Regular rate and rhythm  Pulmonary/Chest: Lungs are clear to auscultation throughout. Effort normal. No respiratory distress. No wheezes, rales or rhonchi.  Neurological: Alert and oriented x3.  Skin: No rash noted on visualized skin.  Psychiatric:The patient has a normal mood and affect.     Labs/Imaging:  Results for orders placed or performed in visit on 03/31/25   XR Chest 2 Views     Status: None    Narrative    EXAM: XR CHEST 2 VIEWS  LOCATION: [facility]  DATE: 3/31/2025    INDICATION: Acute bronchitis with symptoms > 10 days.  COMPARISON: None.      Impression    IMPRESSION:   Negative chest.   Results for orders placed or performed in visit on 03/31/25   Streptococcus A Rapid Screen w/Reflex to PCR - Clinic Collect     Status: Normal    Specimen: Throat; Swab   Result Value Ref Range    Group A Strep antigen Negative Negative       Ramses Sauceda PA-C, March 31, 2025

## 2025-04-26 ENCOUNTER — HEALTH MAINTENANCE LETTER (OUTPATIENT)
Age: 45
End: 2025-04-26

## 2025-07-07 ENCOUNTER — HOSPITAL ENCOUNTER (EMERGENCY)
Facility: CLINIC | Age: 45
Discharge: HOME | End: 2025-07-07
Attending: EMERGENCY MEDICINE | Admitting: EMERGENCY MEDICINE

## 2025-07-07 ENCOUNTER — APPOINTMENT (OUTPATIENT)
Dept: CT IMAGING | Facility: CLINIC | Age: 45
End: 2025-07-07
Attending: EMERGENCY MEDICINE

## 2025-07-07 VITALS
RESPIRATION RATE: 20 BRPM | SYSTOLIC BLOOD PRESSURE: 198 MMHG | OXYGEN SATURATION: 99 % | WEIGHT: 225.97 LBS | DIASTOLIC BLOOD PRESSURE: 89 MMHG | BODY MASS INDEX: 38.79 KG/M2 | HEART RATE: 60 BPM | TEMPERATURE: 97 F

## 2025-07-07 DIAGNOSIS — M54.42 ACUTE LEFT-SIDED LOW BACK PAIN WITH LEFT-SIDED SCIATICA: ICD-10-CM

## 2025-07-07 DIAGNOSIS — D27.0 DERMOID CYST OF OVARY, RIGHT: ICD-10-CM

## 2025-07-07 LAB
ALBUMIN SERPL BCG-MCNC: 4.4 G/DL (ref 3.5–5.2)
ALBUMIN UR-MCNC: NEGATIVE MG/DL
ALP SERPL-CCNC: 92 U/L (ref 40–150)
ALT SERPL W P-5'-P-CCNC: 21 U/L (ref 0–50)
ANION GAP SERPL CALCULATED.3IONS-SCNC: 10 MMOL/L (ref 7–15)
APPEARANCE UR: CLEAR
AST SERPL W P-5'-P-CCNC: 27 U/L (ref 0–45)
BASOPHILS # BLD AUTO: 0 10E3/UL (ref 0–0.2)
BASOPHILS NFR BLD AUTO: 0 %
BILIRUB DIRECT SERPL-MCNC: <0.08 MG/DL (ref 0–0.3)
BILIRUB SERPL-MCNC: 0.3 MG/DL
BILIRUB UR QL STRIP: NEGATIVE
BUN SERPL-MCNC: 14.7 MG/DL (ref 6–20)
CALCIUM SERPL-MCNC: 9.2 MG/DL (ref 8.8–10.4)
CHLORIDE SERPL-SCNC: 104 MMOL/L (ref 98–107)
COLOR UR AUTO: ABNORMAL
CREAT SERPL-MCNC: 0.92 MG/DL (ref 0.51–0.95)
EGFRCR SERPLBLD CKD-EPI 2021: 77 ML/MIN/1.73M2
EOSINOPHIL # BLD AUTO: 0.1 10E3/UL (ref 0–0.7)
EOSINOPHIL NFR BLD AUTO: 2 %
ERYTHROCYTE [DISTWIDTH] IN BLOOD BY AUTOMATED COUNT: 13.2 % (ref 10–15)
GLUCOSE SERPL-MCNC: 118 MG/DL (ref 70–99)
GLUCOSE UR STRIP-MCNC: NEGATIVE MG/DL
HCO3 SERPL-SCNC: 24 MMOL/L (ref 22–29)
HCT VFR BLD AUTO: 39.1 % (ref 35–47)
HGB BLD-MCNC: 12.9 G/DL (ref 11.7–15.7)
HGB UR QL STRIP: NEGATIVE
HOLD SPECIMEN: NORMAL
HOLD SPECIMEN: NORMAL
IMM GRANULOCYTES # BLD: 0 10E3/UL
IMM GRANULOCYTES NFR BLD: 0 %
KETONES UR STRIP-MCNC: NEGATIVE MG/DL
LEUKOCYTE ESTERASE UR QL STRIP: NEGATIVE
LIPASE SERPL-CCNC: 34 U/L (ref 13–60)
LYMPHOCYTES # BLD AUTO: 2.1 10E3/UL (ref 0.8–5.3)
LYMPHOCYTES NFR BLD AUTO: 39 %
MCH RBC QN AUTO: 28.9 PG (ref 26.5–33)
MCHC RBC AUTO-ENTMCNC: 33 G/DL (ref 31.5–36.5)
MCV RBC AUTO: 88 FL (ref 78–100)
MONOCYTES # BLD AUTO: 0.4 10E3/UL (ref 0–1.3)
MONOCYTES NFR BLD AUTO: 7 %
MUCOUS THREADS #/AREA URNS LPF: PRESENT /LPF
NEUTROPHILS # BLD AUTO: 2.9 10E3/UL (ref 1.6–8.3)
NEUTROPHILS NFR BLD AUTO: 52 %
NITRATE UR QL: NEGATIVE
NRBC # BLD AUTO: 0 10E3/UL
NRBC BLD AUTO-RTO: 0 /100
PH UR STRIP: 5 [PH] (ref 5–7)
PLATELET # BLD AUTO: 269 10E3/UL (ref 150–450)
POTASSIUM SERPL-SCNC: 4.3 MMOL/L (ref 3.4–5.3)
PROT SERPL-MCNC: 7.5 G/DL (ref 6.4–8.3)
RBC # BLD AUTO: 4.46 10E6/UL (ref 3.8–5.2)
RBC URINE: 2 /HPF
SODIUM SERPL-SCNC: 138 MMOL/L (ref 135–145)
SP GR UR STRIP: 1.02 (ref 1–1.03)
SQUAMOUS EPITHELIAL: <1 /HPF
UROBILINOGEN UR STRIP-MCNC: NORMAL MG/DL
WBC # BLD AUTO: 5.5 10E3/UL (ref 4–11)
WBC URINE: 1 /HPF

## 2025-07-07 PROCEDURE — 250N000011 HC RX IP 250 OP 636: Performed by: EMERGENCY MEDICINE

## 2025-07-07 PROCEDURE — 80048 BASIC METABOLIC PNL TOTAL CA: CPT | Performed by: EMERGENCY MEDICINE

## 2025-07-07 PROCEDURE — 80048 BASIC METABOLIC PNL TOTAL CA: CPT | Performed by: PHYSICIAN ASSISTANT

## 2025-07-07 PROCEDURE — 85025 COMPLETE CBC W/AUTO DIFF WBC: CPT | Performed by: PHYSICIAN ASSISTANT

## 2025-07-07 PROCEDURE — 250N000013 HC RX MED GY IP 250 OP 250 PS 637: Performed by: EMERGENCY MEDICINE

## 2025-07-07 PROCEDURE — 36415 COLL VENOUS BLD VENIPUNCTURE: CPT | Performed by: EMERGENCY MEDICINE

## 2025-07-07 PROCEDURE — 74177 CT ABD & PELVIS W/CONTRAST: CPT

## 2025-07-07 PROCEDURE — 96374 THER/PROPH/DIAG INJ IV PUSH: CPT

## 2025-07-07 PROCEDURE — 82247 BILIRUBIN TOTAL: CPT | Performed by: EMERGENCY MEDICINE

## 2025-07-07 PROCEDURE — 81003 URINALYSIS AUTO W/O SCOPE: CPT | Performed by: EMERGENCY MEDICINE

## 2025-07-07 PROCEDURE — 85025 COMPLETE CBC W/AUTO DIFF WBC: CPT | Performed by: EMERGENCY MEDICINE

## 2025-07-07 PROCEDURE — 99285 EMERGENCY DEPT VISIT HI MDM: CPT | Mod: 25

## 2025-07-07 PROCEDURE — 82310 ASSAY OF CALCIUM: CPT | Performed by: EMERGENCY MEDICINE

## 2025-07-07 PROCEDURE — 250N000009 HC RX 250: Performed by: EMERGENCY MEDICINE

## 2025-07-07 PROCEDURE — 83690 ASSAY OF LIPASE: CPT | Performed by: EMERGENCY MEDICINE

## 2025-07-07 RX ORDER — HYDROCODONE BITARTRATE AND ACETAMINOPHEN 5; 325 MG/1; MG/1
1-2 TABLET ORAL EVERY 6 HOURS PRN
Qty: 15 TABLET | Refills: 0 | Status: SHIPPED | OUTPATIENT
Start: 2025-07-07 | End: 2025-07-10

## 2025-07-07 RX ORDER — METHYLPREDNISOLONE 4 MG/1
TABLET ORAL
Qty: 21 TABLET | Refills: 0 | Status: SHIPPED | OUTPATIENT
Start: 2025-07-07

## 2025-07-07 RX ORDER — KETOROLAC TROMETHAMINE 15 MG/ML
15 INJECTION, SOLUTION INTRAMUSCULAR; INTRAVENOUS ONCE
Status: COMPLETED | OUTPATIENT
Start: 2025-07-07 | End: 2025-07-07

## 2025-07-07 RX ORDER — IOPAMIDOL 755 MG/ML
120 INJECTION, SOLUTION INTRAVASCULAR ONCE
Status: COMPLETED | OUTPATIENT
Start: 2025-07-07 | End: 2025-07-07

## 2025-07-07 RX ORDER — HYDROCODONE BITARTRATE AND ACETAMINOPHEN 5; 325 MG/1; MG/1
2 TABLET ORAL ONCE
Refills: 0 | Status: COMPLETED | OUTPATIENT
Start: 2025-07-07 | End: 2025-07-07

## 2025-07-07 RX ORDER — METHOCARBAMOL 750 MG/1
750 TABLET, FILM COATED ORAL 4 TIMES DAILY PRN
Qty: 28 TABLET | Refills: 0 | Status: SHIPPED | OUTPATIENT
Start: 2025-07-07

## 2025-07-07 RX ADMIN — IOPAMIDOL 100 ML: 755 INJECTION, SOLUTION INTRAVENOUS at 19:30

## 2025-07-07 RX ADMIN — SODIUM CHLORIDE 65 ML: 9 INJECTION, SOLUTION INTRAVENOUS at 19:32

## 2025-07-07 RX ADMIN — HYDROCODONE BITARTRATE AND ACETAMINOPHEN 2 TABLET: 5; 325 TABLET ORAL at 20:41

## 2025-07-07 RX ADMIN — KETOROLAC TROMETHAMINE 15 MG: 15 INJECTION, SOLUTION INTRAMUSCULAR; INTRAVENOUS at 18:36

## 2025-07-07 ASSESSMENT — ACTIVITIES OF DAILY LIVING (ADL)
ADLS_ACUITY_SCORE: 42

## 2025-07-07 NOTE — ED TRIAGE NOTES
Presents to triage with c/o non traumatic L leg pain that starts at the hip and goes down the entire leg. Started about a week ago. Also endorses R flank pain that started about 6 days ago. Also endorses pain throughout the entire back

## 2025-07-07 NOTE — ED NOTES
I signed up for this patient but patient was never brought to room and sounds like Kristopher Fermin PA-C  07/07/25 [id number]

## 2025-07-07 NOTE — ED PROVIDER NOTES
Emergency Department Note      History of Present Illness   Chief Complaint   Leg Pain and Flank Pain      HPI   Angela Villasenor is a 45 year old female with a history of hypertension, type 2 diabetes, and chronic heart failure who presents to the ED with her son for evaluation of leg pain and flank pain. She reports right sided flank pain and left low back pain radiating down her left leg to her foot. Her flank pain has been going on for the past 6 days and her leg pain began today. She states she endorses intermittent numbness in the left leg described as an increase in pain rather than a lack of sensation.  No recent falls or injuries.  Denies abdominal pain, nausea, vomiting, weakness, urinary symptoms, or loss of sensation. This has never happened before. Patient has been taking Tylenol and ibuprofen for the past 3 days with no relief. No history of kidney stones. No known issues with gallbladder.    Independent Historian   None Son helps translate for the patient when needed at patient request.     Review of External Notes   Most recent appointments was a no-show for a surgical clinic with the provider being part of the weight management clinic, no visits since symptoms began    Past Medical History   Medical History and Problem List   Cardiomyopathy   Chronic heart failure with preserved ejection fraction   External hemorrhoids  Fatty liver  GERD  H. pylori infection  Hypertension  Hypertriglyceridemia  Type 2 diabetes mellitus    Medications   Flexeril  Ferrous fluconate  Lisinopril  Metoprolol succinate  Naprosyn  Crestor  Metformin  Nexium     Surgical History   Colonoscopy  Cystoscopy, inject botox   Physical Exam     Patient Vitals for the past 24 hrs:   BP Temp Temp src Pulse Resp SpO2 Weight   07/07/25 2100 (!) 198/89 -- -- -- -- -- --   07/07/25 2059 -- -- -- 60 20 99 % --   07/07/25 1640 -- -- -- -- -- 98 % --   07/07/25 1637 (!) 160/90 97  F (36.1  C) Temporal 73 18 -- 102.5 kg (225 lb 15.5 oz)      Physical Exam  Eyes:               Sclera white; Pupils are equal and round  ENT:                External ears and nares normal  CV:                  Rate as above with regular rhythm   Resp:               Breath sounds clear and equal bilaterally                          Non-labored, no retractions or accessory muscle use  GI:                   Abdomen is soft, RUQ tenderness                          No rebound tenderness or peritoneal features  MS:                  Moves all extremities, bilateral paraspinal muscle tenderness including R CVA.  Tenderness in musculature of L buttock.  Skin:                Warm and dry  Neuro:             Speech is normal and fluent. No apparent deficit.  Sensation symmetric and intact in legs.  Ankle and toe flexion and extension intact and symmetric.  Able to lift each leg straight up.     Diagnostics   Lab Results   Labs Ordered and Resulted from Time of ED Arrival to Time of ED Departure   ROUTINE UA WITH MICROSCOPIC REFLEX TO CULTURE - Abnormal       Result Value    Color Urine Light Yellow      Appearance Urine Clear      Glucose Urine Negative      Bilirubin Urine Negative      Ketones Urine Negative      Specific Gravity Urine 1.018      Blood Urine Negative      pH Urine 5.0      Protein Albumin Urine Negative      Urobilinogen Urine Normal      Nitrite Urine Negative      Leukocyte Esterase Urine Negative      Mucus Urine Present (*)     RBC Urine 2      WBC Urine 1      Squamous Epithelials Urine <1     BASIC METABOLIC PANEL - Abnormal    Sodium 138      Potassium 4.3      Chloride 104      Carbon Dioxide (CO2) 24      Anion Gap 10      Urea Nitrogen 14.7      Creatinine 0.92      GFR Estimate 77      Calcium 9.2      Glucose 118 (*)    HEPATIC FUNCTION PANEL - Normal    Protein Total 7.5      Albumin 4.4      Bilirubin Total 0.3      Alkaline Phosphatase 92      AST 27      ALT 21      Bilirubin Direct <0.08     LIPASE - Normal    Lipase 34     CBC WITH PLATELETS AND  DIFFERENTIAL    WBC Count 5.5      RBC Count 4.46      Hemoglobin 12.9      Hematocrit 39.1      MCV 88      MCH 28.9      MCHC 33.0      RDW 13.2      Platelet Count 269      % Neutrophils 52      % Lymphocytes 39      % Monocytes 7      % Eosinophils 2      % Basophils 0      % Immature Granulocytes 0      NRBCs per 100 WBC 0      Absolute Neutrophils 2.9      Absolute Lymphocytes 2.1      Absolute Monocytes 0.4      Absolute Eosinophils 0.1      Absolute Basophils 0.0      Absolute Immature Granulocytes 0.0      Absolute NRBCs 0.0       Imaging   CT Abdomen Pelvis w Contrast   Final Result   IMPRESSION:    1.  Right ovarian dermoid increased in size from previous. Abdomen pelvis otherwise negative.        Independent Interpretation   None  ED Course    Medications Administered   Medications   ketorolac (TORADOL) injection 15 mg (15 mg Intravenous $Given 7/7/25 1836)   iopamidol (ISOVUE-370) solution 120 mL (100 mLs Intravenous $Given 7/7/25 1930)   sodium chloride 0.9 % bag for CT scan flush (65 mLs Intravenous $Given 7/7/25 1932)   HYDROcodone-acetaminophen (NORCO) 5-325 MG per tablet 2 tablet (2 tablets Oral $Given 7/7/25 2041)     Procedures   Procedures     Discussion of Management   None    ED Course   ED Course as of 07/07/25 2344   Mon Jul 07, 2025 1823 I obtained history and examined the patient as noted above.    2037 I rechecked the patient and explained findings.    2051 I prepared the patient to be discharged home.      Additional Documentation  None  Medical Decision Making / Diagnosis   CMS Diagnoses: None    MIPS       None    [facility]   Angela Villasenor is a 45 year old female who presents for evaluation of back pain and radicular symptoms. She did not sustain any trauma and has no history of cancer, therefore x-rays are not necessary due to the low likelihood of fracture or subluxation. There is no clinical evidence of cauda equina syndrome, discitis, spinal/epidural space hematoma or epidural abscess  or other emergently worrisome etiology.  Advanced imaging with MRI is not indicated at this time, but may be indicated in the future if symptoms fail to resolve.  The patient was advised that radiculopathy often takes significant time to resolve.  PT referral and Spine  referral.  Return if increasing pain, muscular weakness, or bowel or bladder dysfunction.     She is also having right-sided abdominal and flank pain with associated tenderness concerning for ovarian pathology, ureteral stone, appendicitis, amongst others.  No laboratory evidence for hepatitis or pancreatitis or biliary obstruction and gallstones are not felt clinically likely.  CT scan shows an enlarging dermoid cyst.  Since she has had this before it is unclear if it is the source of her pain.  The pain in the abdomen is less significant compared to the pain in the leg.  Torsion is not thought likely and if present, would not be affecting fertility based on her age and likely perimenopausal status.  Outpatient pain medication appropriate with gynecology follow-up.  Referral placed.    Blood pressure was higher on discharge recheck.  Normally takes blood pressure medications in the evenings.  There were no symptoms to suggest endorgan damage such as cardiac or neurologic sequelae of high blood pressure.  Okay to go home and take regular blood pressure medications and follow-up with primary care.    Disposition   The patient was discharged.     Diagnosis     ICD-10-CM    1. Dermoid cyst of ovary, right  D27.0 Ob/Gyn  Referral      2. Acute left-sided low back pain with left-sided sciatica  M54.42 Spine  Referral     Physical Therapy  Referral           Discharge Medications   Discharge Medication List as of 7/7/2025  8:56 PM        START taking these medications    Details   HYDROcodone-acetaminophen (NORCO) 5-325 MG tablet Take 1-2 tablets by mouth every 6 hours as needed for moderate to severe pain., Disp-15  tablet, R-0, E-Prescribe      methocarbamol (ROBAXIN) 750 MG tablet Take 1 tablet (750 mg) by mouth 4 times daily as needed (muscle pain/spasm)., Disp-28 tablet, R-0, E-Prescribe      methylPREDNISolone (MEDROL DOSEPAK) 4 MG tablet therapy pack Follow Package Directions, Disp-21 tablet, R-0, E-Prescribe           Scribe Disclosure:  I, Delia Wallace, am serving as a scribe at 6:30 PM on 7/7/2025 to document services personally performed by Tonya Singer MD based on my observations and the provider's statements to me.      Tonya Singer MD  07/07/25 [id number]

## 2025-07-08 NOTE — ED NOTES
Pt bp is elevated, Pt does have BP meds she takes at night time; md notified. Md stated ok to discharge pt

## 2025-08-09 ENCOUNTER — HEALTH MAINTENANCE LETTER (OUTPATIENT)
Age: 45
End: 2025-08-09

## 2025-08-13 PROBLEM — D27.0 CYST, OVARY, DERMOID, RIGHT: Chronic | Status: ACTIVE | Noted: 2021-12-16

## 2025-08-13 PROBLEM — D27.0 CYST, OVARY, DERMOID, RIGHT: Status: ACTIVE | Noted: 2025-08-13
